# Patient Record
Sex: MALE | Race: BLACK OR AFRICAN AMERICAN | NOT HISPANIC OR LATINO | Employment: FULL TIME | RURAL
[De-identification: names, ages, dates, MRNs, and addresses within clinical notes are randomized per-mention and may not be internally consistent; named-entity substitution may affect disease eponyms.]

---

## 2016-11-07 LAB — CRC RECOMMENDATION EXT: NORMAL

## 2020-10-28 ENCOUNTER — HISTORICAL (OUTPATIENT)
Dept: ADMINISTRATIVE | Facility: HOSPITAL | Age: 55
End: 2020-10-28

## 2020-10-28 LAB
ANION GAP SERPL CALCULATED.3IONS-SCNC: 8 MMOL/L (ref 7–16)
BUN SERPL-MCNC: 12 MG/DL (ref 7–18)
CALCIUM SERPL-MCNC: 9.3 MG/DL (ref 8.5–10.1)
CHLORIDE SERPL-SCNC: 109 MMOL/L (ref 98–107)
CO2 SERPL-SCNC: 28 MMOL/L (ref 21–32)
CREAT SERPL-MCNC: 1.26 MG/DL (ref 0.7–1.3)
GLUCOSE SERPL-MCNC: 97 MG/DL (ref 74–106)
POTASSIUM SERPL-SCNC: 3.6 MMOL/L (ref 3.5–5.1)
PSA SERPL-MCNC: 0.79 NG/ML (ref 0–3.1)
SODIUM SERPL-SCNC: 141 MMOL/L (ref 136–145)

## 2021-08-10 ENCOUNTER — OFFICE VISIT (OUTPATIENT)
Dept: FAMILY MEDICINE | Facility: CLINIC | Age: 56
End: 2021-08-10
Payer: COMMERCIAL

## 2021-08-10 VITALS
OXYGEN SATURATION: 100 % | RESPIRATION RATE: 18 BRPM | TEMPERATURE: 98 F | DIASTOLIC BLOOD PRESSURE: 94 MMHG | HEART RATE: 68 BPM | SYSTOLIC BLOOD PRESSURE: 154 MMHG

## 2021-08-10 DIAGNOSIS — Z20.822 EXPOSURE TO COVID-19 VIRUS: ICD-10-CM

## 2021-08-10 DIAGNOSIS — U07.1 COVID-19: Primary | ICD-10-CM

## 2021-08-10 LAB
CTP QC/QA: YES
SARS-COV-2 AG RESP QL IA.RAPID: POSITIVE

## 2021-08-10 PROCEDURE — 99202 PR OFFICE/OUTPT VISIT, NEW, LEVL II, 15-29 MIN: ICD-10-PCS | Mod: ,,, | Performed by: FAMILY MEDICINE

## 2021-08-10 PROCEDURE — 3077F PR MOST RECENT SYSTOLIC BLOOD PRESSURE >= 140 MM HG: ICD-10-PCS | Mod: ,,, | Performed by: FAMILY MEDICINE

## 2021-08-10 PROCEDURE — 1159F MED LIST DOCD IN RCRD: CPT | Mod: ,,, | Performed by: FAMILY MEDICINE

## 2021-08-10 PROCEDURE — 87426 SARS CORONAVIRUS 2 ANTIGEN POCT: ICD-10-PCS | Mod: QW,,, | Performed by: FAMILY MEDICINE

## 2021-08-10 PROCEDURE — 87426 SARSCOV CORONAVIRUS AG IA: CPT | Mod: QW,,, | Performed by: FAMILY MEDICINE

## 2021-08-10 PROCEDURE — 1160F PR REVIEW ALL MEDS BY PRESCRIBER/CLIN PHARMACIST DOCUMENTED: ICD-10-PCS | Mod: ,,, | Performed by: FAMILY MEDICINE

## 2021-08-10 PROCEDURE — 3077F SYST BP >= 140 MM HG: CPT | Mod: ,,, | Performed by: FAMILY MEDICINE

## 2021-08-10 PROCEDURE — 3080F DIAST BP >= 90 MM HG: CPT | Mod: ,,, | Performed by: FAMILY MEDICINE

## 2021-08-10 PROCEDURE — 1159F PR MEDICATION LIST DOCUMENTED IN MEDICAL RECORD: ICD-10-PCS | Mod: ,,, | Performed by: FAMILY MEDICINE

## 2021-08-10 PROCEDURE — 3080F PR MOST RECENT DIASTOLIC BLOOD PRESSURE >= 90 MM HG: ICD-10-PCS | Mod: ,,, | Performed by: FAMILY MEDICINE

## 2021-08-10 PROCEDURE — 1160F RVW MEDS BY RX/DR IN RCRD: CPT | Mod: ,,, | Performed by: FAMILY MEDICINE

## 2021-08-10 PROCEDURE — 99202 OFFICE O/P NEW SF 15 MIN: CPT | Mod: ,,, | Performed by: FAMILY MEDICINE

## 2021-08-10 RX ORDER — AMLODIPINE BESYLATE 10 MG/1
10 TABLET ORAL NIGHTLY
COMMUNITY
Start: 2021-07-31 | End: 2021-11-16 | Stop reason: SDUPTHER

## 2021-08-10 RX ORDER — METOPROLOL SUCCINATE 200 MG/1
400 TABLET, EXTENDED RELEASE ORAL DAILY
COMMUNITY
Start: 2021-08-04 | End: 2021-11-16 | Stop reason: SDUPTHER

## 2021-08-11 ENCOUNTER — TELEPHONE (OUTPATIENT)
Dept: INFECTIOUS DISEASES | Facility: HOSPITAL | Age: 56
End: 2021-08-11

## 2021-11-16 ENCOUNTER — OFFICE VISIT (OUTPATIENT)
Dept: FAMILY MEDICINE | Facility: CLINIC | Age: 56
End: 2021-11-16
Payer: COMMERCIAL

## 2021-11-16 VITALS
RESPIRATION RATE: 16 BRPM | SYSTOLIC BLOOD PRESSURE: 160 MMHG | HEIGHT: 73 IN | TEMPERATURE: 98 F | BODY MASS INDEX: 31.3 KG/M2 | WEIGHT: 236.19 LBS | HEART RATE: 79 BPM | OXYGEN SATURATION: 99 % | DIASTOLIC BLOOD PRESSURE: 110 MMHG

## 2021-11-16 DIAGNOSIS — Z11.4 SCREENING FOR HIV (HUMAN IMMUNODEFICIENCY VIRUS): ICD-10-CM

## 2021-11-16 DIAGNOSIS — R11.0 NAUSEA: ICD-10-CM

## 2021-11-16 DIAGNOSIS — I10 PRIMARY HYPERTENSION: Primary | ICD-10-CM

## 2021-11-16 DIAGNOSIS — Z11.59 ENCOUNTER FOR HEPATITIS C SCREENING TEST FOR LOW RISK PATIENT: ICD-10-CM

## 2021-11-16 PROCEDURE — 3077F PR MOST RECENT SYSTOLIC BLOOD PRESSURE >= 140 MM HG: ICD-10-PCS | Mod: ,,, | Performed by: FAMILY MEDICINE

## 2021-11-16 PROCEDURE — 96372 PR INJECTION,THERAP/PROPH/DIAG2ST, IM OR SUBCUT: ICD-10-PCS | Mod: ,,, | Performed by: FAMILY MEDICINE

## 2021-11-16 PROCEDURE — 3008F BODY MASS INDEX DOCD: CPT | Mod: ,,, | Performed by: FAMILY MEDICINE

## 2021-11-16 PROCEDURE — 3080F DIAST BP >= 90 MM HG: CPT | Mod: ,,, | Performed by: FAMILY MEDICINE

## 2021-11-16 PROCEDURE — 3080F PR MOST RECENT DIASTOLIC BLOOD PRESSURE >= 90 MM HG: ICD-10-PCS | Mod: ,,, | Performed by: FAMILY MEDICINE

## 2021-11-16 PROCEDURE — 3077F SYST BP >= 140 MM HG: CPT | Mod: ,,, | Performed by: FAMILY MEDICINE

## 2021-11-16 PROCEDURE — 1159F MED LIST DOCD IN RCRD: CPT | Mod: ,,, | Performed by: FAMILY MEDICINE

## 2021-11-16 PROCEDURE — 99214 OFFICE O/P EST MOD 30 MIN: CPT | Mod: 25,,, | Performed by: FAMILY MEDICINE

## 2021-11-16 PROCEDURE — 3008F PR BODY MASS INDEX (BMI) DOCUMENTED: ICD-10-PCS | Mod: ,,, | Performed by: FAMILY MEDICINE

## 2021-11-16 PROCEDURE — 1159F PR MEDICATION LIST DOCUMENTED IN MEDICAL RECORD: ICD-10-PCS | Mod: ,,, | Performed by: FAMILY MEDICINE

## 2021-11-16 PROCEDURE — 1160F RVW MEDS BY RX/DR IN RCRD: CPT | Mod: ,,, | Performed by: FAMILY MEDICINE

## 2021-11-16 PROCEDURE — 96372 THER/PROPH/DIAG INJ SC/IM: CPT | Mod: ,,, | Performed by: FAMILY MEDICINE

## 2021-11-16 PROCEDURE — 1160F PR REVIEW ALL MEDS BY PRESCRIBER/CLIN PHARMACIST DOCUMENTED: ICD-10-PCS | Mod: ,,, | Performed by: FAMILY MEDICINE

## 2021-11-16 PROCEDURE — 99214 PR OFFICE/OUTPT VISIT, EST, LEVL IV, 30-39 MIN: ICD-10-PCS | Mod: 25,,, | Performed by: FAMILY MEDICINE

## 2021-11-16 RX ORDER — ONDANSETRON 2 MG/ML
4 INJECTION INTRAMUSCULAR; INTRAVENOUS ONCE
Status: COMPLETED | OUTPATIENT
Start: 2021-11-16 | End: 2021-11-16

## 2021-11-16 RX ORDER — METOPROLOL SUCCINATE 200 MG/1
400 TABLET, EXTENDED RELEASE ORAL DAILY
Qty: 180 TABLET | Refills: 1 | Status: SHIPPED | OUTPATIENT
Start: 2021-11-16 | End: 2022-01-27 | Stop reason: SDUPTHER

## 2021-11-16 RX ORDER — CLONIDINE HYDROCHLORIDE 0.1 MG/1
0.1 TABLET ORAL
Status: COMPLETED | OUTPATIENT
Start: 2021-11-16 | End: 2021-11-16

## 2021-11-16 RX ORDER — AMLODIPINE BESYLATE 10 MG/1
10 TABLET ORAL NIGHTLY
Qty: 90 TABLET | Refills: 1 | Status: SHIPPED | OUTPATIENT
Start: 2021-11-16 | End: 2022-01-27 | Stop reason: SDUPTHER

## 2021-11-16 RX ADMIN — ONDANSETRON 4 MG: 2 INJECTION INTRAMUSCULAR; INTRAVENOUS at 03:11

## 2021-11-16 RX ADMIN — CLONIDINE HYDROCHLORIDE 0.1 MG: 0.1 TABLET ORAL at 03:11

## 2022-01-27 ENCOUNTER — OFFICE VISIT (OUTPATIENT)
Dept: FAMILY MEDICINE | Facility: CLINIC | Age: 57
End: 2022-01-27
Payer: COMMERCIAL

## 2022-01-27 VITALS
SYSTOLIC BLOOD PRESSURE: 151 MMHG | DIASTOLIC BLOOD PRESSURE: 94 MMHG | WEIGHT: 236 LBS | HEIGHT: 72 IN | BODY MASS INDEX: 31.97 KG/M2 | TEMPERATURE: 98 F | OXYGEN SATURATION: 99 % | HEART RATE: 64 BPM | RESPIRATION RATE: 20 BRPM

## 2022-01-27 DIAGNOSIS — I10 PRIMARY HYPERTENSION: ICD-10-CM

## 2022-01-27 DIAGNOSIS — Z23 NEED FOR VACCINATION: ICD-10-CM

## 2022-01-27 DIAGNOSIS — I10 WHITE COAT SYNDROME WITH DIAGNOSIS OF HYPERTENSION: ICD-10-CM

## 2022-01-27 DIAGNOSIS — Z12.11 ENCOUNTER FOR SCREENING COLONOSCOPY: ICD-10-CM

## 2022-01-27 DIAGNOSIS — Z00.00 ROUTINE GENERAL MEDICAL EXAMINATION AT A HEALTH CARE FACILITY: Primary | ICD-10-CM

## 2022-01-27 DIAGNOSIS — Z11.4 SCREENING FOR HIV (HUMAN IMMUNODEFICIENCY VIRUS): ICD-10-CM

## 2022-01-27 DIAGNOSIS — Z11.59 ENCOUNTER FOR HEPATITIS C SCREENING TEST FOR LOW RISK PATIENT: ICD-10-CM

## 2022-01-27 DIAGNOSIS — Z13.1 DIABETES MELLITUS SCREENING: ICD-10-CM

## 2022-01-27 DIAGNOSIS — Z13.220 ENCOUNTER FOR SCREENING FOR LIPID DISORDER: ICD-10-CM

## 2022-01-27 LAB
CHOLEST SERPL-MCNC: 162 MG/DL (ref 0–200)
CHOLEST/HDLC SERPL: 3.1 {RATIO}
GLUCOSE SERPL-MCNC: 115 MG/DL (ref 74–106)
HCV AB SER QL: NORMAL
HDLC SERPL-MCNC: 52 MG/DL (ref 40–60)
HIV 1+O+2 AB SERPL QL: NORMAL
LDLC SERPL CALC-MCNC: 101 MG/DL
LDLC/HDLC SERPL: 1.9 {RATIO}
NONHDLC SERPL-MCNC: 110 MG/DL
TRIGL SERPL-MCNC: 47 MG/DL (ref 35–150)
VLDLC SERPL-MCNC: 9 MG/DL

## 2022-01-27 PROCEDURE — 90715 TDAP VACCINE 7 YRS/> IM: CPT | Mod: ,,, | Performed by: FAMILY MEDICINE

## 2022-01-27 PROCEDURE — 1159F MED LIST DOCD IN RCRD: CPT | Mod: ,,, | Performed by: FAMILY MEDICINE

## 2022-01-27 PROCEDURE — 3077F SYST BP >= 140 MM HG: CPT | Mod: ,,, | Performed by: FAMILY MEDICINE

## 2022-01-27 PROCEDURE — 99396 PREV VISIT EST AGE 40-64: CPT | Mod: 25,,, | Performed by: FAMILY MEDICINE

## 2022-01-27 PROCEDURE — 82947 ASSAY GLUCOSE BLOOD QUANT: CPT | Mod: ,,, | Performed by: CLINICAL MEDICAL LABORATORY

## 2022-01-27 PROCEDURE — 90471 TDAP VACCINE GREATER THAN OR EQUAL TO 7YO IM: ICD-10-PCS | Mod: ,,, | Performed by: FAMILY MEDICINE

## 2022-01-27 PROCEDURE — 80061 LIPID PANEL: ICD-10-PCS | Mod: ,,, | Performed by: CLINICAL MEDICAL LABORATORY

## 2022-01-27 PROCEDURE — 3077F PR MOST RECENT SYSTOLIC BLOOD PRESSURE >= 140 MM HG: ICD-10-PCS | Mod: ,,, | Performed by: FAMILY MEDICINE

## 2022-01-27 PROCEDURE — 87389 HIV 1 / 2 ANTIBODY: ICD-10-PCS | Mod: ,,, | Performed by: CLINICAL MEDICAL LABORATORY

## 2022-01-27 PROCEDURE — 90471 IMMUNIZATION ADMIN: CPT | Mod: ,,, | Performed by: FAMILY MEDICINE

## 2022-01-27 PROCEDURE — 3008F BODY MASS INDEX DOCD: CPT | Mod: ,,, | Performed by: FAMILY MEDICINE

## 2022-01-27 PROCEDURE — 3080F DIAST BP >= 90 MM HG: CPT | Mod: ,,, | Performed by: FAMILY MEDICINE

## 2022-01-27 PROCEDURE — 90715 TDAP VACCINE GREATER THAN OR EQUAL TO 7YO IM: ICD-10-PCS | Mod: ,,, | Performed by: FAMILY MEDICINE

## 2022-01-27 PROCEDURE — 3080F PR MOST RECENT DIASTOLIC BLOOD PRESSURE >= 90 MM HG: ICD-10-PCS | Mod: ,,, | Performed by: FAMILY MEDICINE

## 2022-01-27 PROCEDURE — 1160F PR REVIEW ALL MEDS BY PRESCRIBER/CLIN PHARMACIST DOCUMENTED: ICD-10-PCS | Mod: ,,, | Performed by: FAMILY MEDICINE

## 2022-01-27 PROCEDURE — 80061 LIPID PANEL: CPT | Mod: ,,, | Performed by: CLINICAL MEDICAL LABORATORY

## 2022-01-27 PROCEDURE — 82947 GLUCOSE, FASTING: ICD-10-PCS | Mod: ,,, | Performed by: CLINICAL MEDICAL LABORATORY

## 2022-01-27 PROCEDURE — 99396 PR PREVENTIVE VISIT,EST,40-64: ICD-10-PCS | Mod: 25,,, | Performed by: FAMILY MEDICINE

## 2022-01-27 PROCEDURE — 87389 HIV-1 AG W/HIV-1&-2 AB AG IA: CPT | Mod: ,,, | Performed by: CLINICAL MEDICAL LABORATORY

## 2022-01-27 PROCEDURE — 86803 HEPATITIS C ANTIBODY: ICD-10-PCS | Mod: ,,, | Performed by: CLINICAL MEDICAL LABORATORY

## 2022-01-27 PROCEDURE — 1160F RVW MEDS BY RX/DR IN RCRD: CPT | Mod: ,,, | Performed by: FAMILY MEDICINE

## 2022-01-27 PROCEDURE — 3008F PR BODY MASS INDEX (BMI) DOCUMENTED: ICD-10-PCS | Mod: ,,, | Performed by: FAMILY MEDICINE

## 2022-01-27 PROCEDURE — 86803 HEPATITIS C AB TEST: CPT | Mod: ,,, | Performed by: CLINICAL MEDICAL LABORATORY

## 2022-01-27 PROCEDURE — 1159F PR MEDICATION LIST DOCUMENTED IN MEDICAL RECORD: ICD-10-PCS | Mod: ,,, | Performed by: FAMILY MEDICINE

## 2022-01-27 RX ORDER — AMLODIPINE BESYLATE 10 MG/1
10 TABLET ORAL NIGHTLY
Qty: 90 TABLET | Refills: 1 | Status: SHIPPED | OUTPATIENT
Start: 2022-01-27 | End: 2022-06-27 | Stop reason: SDUPTHER

## 2022-01-27 RX ORDER — METOPROLOL SUCCINATE 200 MG/1
400 TABLET, EXTENDED RELEASE ORAL DAILY
Qty: 180 TABLET | Refills: 1 | Status: SHIPPED | OUTPATIENT
Start: 2022-01-27 | End: 2022-05-11

## 2022-01-27 NOTE — PROGRESS NOTES
Ashley Rodriguez MD        PATIENT NAME: Maranda Clark  : 1965  DATE: 22  MRN: [id number]      Billing Provider: Ashley Rodriguez MD  Level of Service: AL PREVENTIVE VISIT,EST,40-64  Patient PCP Information     Provider PCP Type    Ashley Rodriguez MD General          Reason for Visit / Chief Complaint: Annual Exam       History of Present Illness      Maranda Clark presents to the clinic with Annual Exam       dental visits discussed    sun screen use discussed, use of helmets and seat belts discussed  Gun safety discussed  Sleep discussed  Diet and exercise discussed    At home his BP is much better in the 130s/80s      Review of Systems     Review of Systems   Constitutional: Negative for activity change, appetite change, fatigue and fever.   Respiratory: Negative for shortness of breath.    Allergic/Immunologic: Positive for environmental allergies.   Psychiatric/Behavioral: Negative for agitation, behavioral problems and suicidal ideas.       Medical / Social / Family History   History reviewed. No pertinent past medical history.    History reviewed. No pertinent surgical history.    Social History    reports that he has never smoked. He has never used smokeless tobacco. He reports current alcohol use. He reports that he does not use drugs.    Family History  's family history is not on file.    Medications and Allergies     Medications  Outpatient Medications Marked as Taking for the 22 encounter (Office Visit) with Ashley Rodriguez MD   Medication Sig Dispense Refill    [DISCONTINUED] amLODIPine (NORVASC) 10 MG tablet Take 1 tablet (10 mg total) by mouth nightly. 90 tablet 1    [DISCONTINUED] metoprolol succinate (TOPROL-XL) 200 MG 24 hr tablet Take 2 tablets (400 mg total) by mouth once daily. 180 tablet 1       Allergies  Review of patient's allergies indicates:  No Known Allergies    Physical Examination   BP (!) 151/94 (BP Location: Right arm, Patient Position: Sitting, BP Method:  Medium (Automatic))   Pulse 64   Temp 98.2 °F (36.8 °C) (Temporal)   Resp 20   Ht 6' (1.829 m)   Wt 107 kg (236 lb)   SpO2 99%   BMI 32.01 kg/m²     Physical Exam  Constitutional:       Appearance: Normal appearance. He is normal weight.   Cardiovascular:      Rate and Rhythm: Normal rate and regular rhythm.   Pulmonary:      Effort: Pulmonary effort is normal.      Breath sounds: Normal breath sounds.   Neurological:      Mental Status: He is alert.   Psychiatric:         Mood and Affect: Mood normal.         Behavior: Behavior normal.         Assessment and Plan (including Health Maintenance)     Plan:         Problem List Items Addressed This Visit    None     Visit Diagnoses     Routine general medical examination at a health care facility    -  Primary    Encounter for screening for lipid disorder        Relevant Orders    Lipid Panel    Diabetes mellitus screening        Relevant Orders    Glucose, Fasting    Encounter for hepatitis C screening test for low risk patient        Relevant Orders    Hepatitis C Antibody    Screening for HIV (human immunodeficiency virus)        Relevant Orders    HIV 1/2 Ag/Ab (4th Gen)    Primary hypertension        Relevant Medications    amLODIPine (NORVASC) 10 MG tablet    metoprolol succinate (TOPROL-XL) 200 MG 24 hr tablet    White coat syndrome with diagnosis of hypertension        Need for vaccination        Relevant Orders    (In Office Administered) Tdap Vaccine    Encounter for screening colonoscopy        Relevant Orders    Ambulatory referral/consult to Gastroenterology          He is going to try to improve healthy choices, reduce calorie intake, try to do more physical activity to lose weight. Recommended at least 150 minutes a week with resistance training as tolerated  Monitor weight  attend regularly scheduled apts   Schedule yearly eye exam  Take medications as prescirved  Improve nutrition, decrease car intake, decrease fast food  Stay away from tobacco  products  Sun screen recommended and discuseed        Follow up in about 1 year (around 1/27/2023).        Signature:  Ashley Rodriguez MD      Date of encounter: 1/27/22

## 2022-02-15 DIAGNOSIS — Z86.010 HISTORY OF COLON POLYPS: Primary | ICD-10-CM

## 2022-06-27 ENCOUNTER — OFFICE VISIT (OUTPATIENT)
Dept: FAMILY MEDICINE | Facility: CLINIC | Age: 57
End: 2022-06-27
Payer: COMMERCIAL

## 2022-06-27 VITALS
WEIGHT: 225 LBS | DIASTOLIC BLOOD PRESSURE: 92 MMHG | TEMPERATURE: 99 F | RESPIRATION RATE: 20 BRPM | BODY MASS INDEX: 30.48 KG/M2 | HEIGHT: 72 IN | OXYGEN SATURATION: 95 % | SYSTOLIC BLOOD PRESSURE: 136 MMHG | HEART RATE: 52 BPM

## 2022-06-27 DIAGNOSIS — L30.9 DERMATITIS: ICD-10-CM

## 2022-06-27 DIAGNOSIS — F10.10 ALCOHOL ABUSE: ICD-10-CM

## 2022-06-27 DIAGNOSIS — Z12.5 SCREENING PSA (PROSTATE SPECIFIC ANTIGEN): ICD-10-CM

## 2022-06-27 DIAGNOSIS — I10 PRIMARY HYPERTENSION: Primary | ICD-10-CM

## 2022-06-27 LAB
ALBUMIN SERPL BCP-MCNC: 3.6 G/DL (ref 3.5–5)
ALBUMIN/GLOB SERPL: 0.9 {RATIO}
ALP SERPL-CCNC: 70 U/L (ref 45–115)
ALT SERPL W P-5'-P-CCNC: 34 U/L (ref 16–61)
ANION GAP SERPL CALCULATED.3IONS-SCNC: 7 MMOL/L (ref 7–16)
AST SERPL W P-5'-P-CCNC: 25 U/L (ref 15–37)
BILIRUB SERPL-MCNC: 0.4 MG/DL (ref 0–1.2)
BUN SERPL-MCNC: 11 MG/DL (ref 7–18)
BUN/CREAT SERPL: 10 (ref 6–20)
CALCIUM SERPL-MCNC: 9.6 MG/DL (ref 8.5–10.1)
CHLORIDE SERPL-SCNC: 107 MMOL/L (ref 98–107)
CHOLEST SERPL-MCNC: 208 MG/DL (ref 0–200)
CHOLEST/HDLC SERPL: 4.1 {RATIO}
CO2 SERPL-SCNC: 29 MMOL/L (ref 21–32)
CREAT SERPL-MCNC: 1.06 MG/DL (ref 0.7–1.3)
GLOBULIN SER-MCNC: 3.9 G/DL (ref 2–4)
GLUCOSE SERPL-MCNC: 99 MG/DL (ref 74–106)
HDLC SERPL-MCNC: 51 MG/DL (ref 40–60)
LDLC SERPL CALC-MCNC: 145 MG/DL
LDLC/HDLC SERPL: 2.8 {RATIO}
NONHDLC SERPL-MCNC: 157 MG/DL
POTASSIUM SERPL-SCNC: 3.9 MMOL/L (ref 3.5–5.1)
PROT SERPL-MCNC: 7.5 G/DL (ref 6.4–8.2)
SODIUM SERPL-SCNC: 139 MMOL/L (ref 136–145)
TRIGL SERPL-MCNC: 61 MG/DL (ref 35–150)
VLDLC SERPL-MCNC: 12 MG/DL

## 2022-06-27 PROCEDURE — 84154 ASSAY OF PSA FREE: CPT | Mod: 90,,, | Performed by: CLINICAL MEDICAL LABORATORY

## 2022-06-27 PROCEDURE — 99213 PR OFFICE/OUTPT VISIT, EST, LEVL III, 20-29 MIN: ICD-10-PCS | Mod: ,,, | Performed by: FAMILY MEDICINE

## 2022-06-27 PROCEDURE — 80061 LIPID PANEL: CPT | Mod: ,,, | Performed by: CLINICAL MEDICAL LABORATORY

## 2022-06-27 PROCEDURE — 3008F PR BODY MASS INDEX (BMI) DOCUMENTED: ICD-10-PCS | Mod: ,,, | Performed by: FAMILY MEDICINE

## 2022-06-27 PROCEDURE — 3075F SYST BP GE 130 - 139MM HG: CPT | Mod: ,,, | Performed by: FAMILY MEDICINE

## 2022-06-27 PROCEDURE — 3075F PR MOST RECENT SYSTOLIC BLOOD PRESS GE 130-139MM HG: ICD-10-PCS | Mod: ,,, | Performed by: FAMILY MEDICINE

## 2022-06-27 PROCEDURE — 80053 COMPREHEN METABOLIC PANEL: CPT | Mod: ,,, | Performed by: CLINICAL MEDICAL LABORATORY

## 2022-06-27 PROCEDURE — 80053 COMPREHENSIVE METABOLIC PANEL: ICD-10-PCS | Mod: ,,, | Performed by: CLINICAL MEDICAL LABORATORY

## 2022-06-27 PROCEDURE — 84153 PSA, TOTAL AND FREE: ICD-10-PCS | Mod: 90,,, | Performed by: CLINICAL MEDICAL LABORATORY

## 2022-06-27 PROCEDURE — 1160F RVW MEDS BY RX/DR IN RCRD: CPT | Mod: ,,, | Performed by: FAMILY MEDICINE

## 2022-06-27 PROCEDURE — 3080F PR MOST RECENT DIASTOLIC BLOOD PRESSURE >= 90 MM HG: ICD-10-PCS | Mod: ,,, | Performed by: FAMILY MEDICINE

## 2022-06-27 PROCEDURE — 80061 LIPID PANEL: ICD-10-PCS | Mod: ,,, | Performed by: CLINICAL MEDICAL LABORATORY

## 2022-06-27 PROCEDURE — 1160F PR REVIEW ALL MEDS BY PRESCRIBER/CLIN PHARMACIST DOCUMENTED: ICD-10-PCS | Mod: ,,, | Performed by: FAMILY MEDICINE

## 2022-06-27 PROCEDURE — 84154 PSA, TOTAL AND FREE: ICD-10-PCS | Mod: 90,,, | Performed by: CLINICAL MEDICAL LABORATORY

## 2022-06-27 PROCEDURE — 1159F PR MEDICATION LIST DOCUMENTED IN MEDICAL RECORD: ICD-10-PCS | Mod: ,,, | Performed by: FAMILY MEDICINE

## 2022-06-27 PROCEDURE — 3080F DIAST BP >= 90 MM HG: CPT | Mod: ,,, | Performed by: FAMILY MEDICINE

## 2022-06-27 PROCEDURE — 3008F BODY MASS INDEX DOCD: CPT | Mod: ,,, | Performed by: FAMILY MEDICINE

## 2022-06-27 PROCEDURE — 84153 ASSAY OF PSA TOTAL: CPT | Mod: 90,,, | Performed by: CLINICAL MEDICAL LABORATORY

## 2022-06-27 PROCEDURE — 99213 OFFICE O/P EST LOW 20 MIN: CPT | Mod: ,,, | Performed by: FAMILY MEDICINE

## 2022-06-27 PROCEDURE — 1159F MED LIST DOCD IN RCRD: CPT | Mod: ,,, | Performed by: FAMILY MEDICINE

## 2022-06-27 RX ORDER — METOPROLOL SUCCINATE 200 MG/1
400 TABLET, EXTENDED RELEASE ORAL DAILY
Qty: 180 TABLET | Refills: 1 | Status: SHIPPED | OUTPATIENT
Start: 2022-06-27 | End: 2022-12-07

## 2022-06-27 RX ORDER — DESONIDE 0.5 MG/G
CREAM TOPICAL 2 TIMES DAILY
Qty: 60 G | Refills: 3 | Status: SHIPPED | OUTPATIENT
Start: 2022-06-27 | End: 2023-01-09

## 2022-06-27 RX ORDER — AMLODIPINE BESYLATE 10 MG/1
10 TABLET ORAL NIGHTLY
Qty: 90 TABLET | Refills: 1 | Status: SHIPPED | OUTPATIENT
Start: 2022-06-27 | End: 2022-12-07

## 2022-06-27 NOTE — PROGRESS NOTES
Ashley Rodriguez MD        PATIENT NAME: Maranda Clark  : 1965  DATE: 22  MRN: [id number]      Billing Provider: Ashley Rodriguez MD  Level of Service:   Patient PCP Information     Provider PCP Type    Ashley Rodriguez MD General          Reason for Visit / Chief Complaint: Medication Refill       History of Present Illness      Maranda Clark presents to the clinic with Medication Refill     He is here for medication refill, he did not bring his medications, he does not know the name of his medication, but he does need refills on levothyroxine, he has acquire hypothyroidism, has not had a recent TSH, BP noted    Drinks about 24 beers a week           Review of Systems     Review of Systems   Constitutional: Negative for activity change and unexpected weight change.   HENT: Negative for hearing loss, rhinorrhea and trouble swallowing.    Eyes: Negative for discharge and visual disturbance.   Respiratory: Negative for chest tightness and wheezing.    Cardiovascular: Negative for chest pain and palpitations.   Gastrointestinal: Negative for blood in stool, constipation, diarrhea and vomiting.   Endocrine: Negative for polydipsia and polyuria.   Genitourinary: Negative for difficulty urinating, hematuria and urgency.   Musculoskeletal: Negative for arthralgias, joint swelling and neck pain.   Neurological: Negative for weakness and headaches.   Psychiatric/Behavioral: Negative for confusion and dysphoric mood.       Medical / Social / Family History   History reviewed. No pertinent past medical history.    History reviewed. No pertinent surgical history.    Social History    reports that he has never smoked. He has never used smokeless tobacco. He reports current alcohol use. He reports that he does not use drugs.    Family History  's family history is not on file.    Medications and Allergies     Medications  Outpatient Medications Marked as Taking for the 22 encounter (Office Visit) with Ashley  MD Michael   Medication Sig Dispense Refill    [DISCONTINUED] amLODIPine (NORVASC) 10 MG tablet Take 1 tablet (10 mg total) by mouth nightly. 90 tablet 1    [DISCONTINUED] metoprolol succinate (TOPROL-XL) 200 MG 24 hr tablet Take 2 tablets by mouth once daily 60 tablet 0       Allergies  Review of patient's allergies indicates:  No Known Allergies    Physical Examination   BP (!) 136/92   Pulse (!) 52   Temp 98.7 °F (37.1 °C) (Oral)   Resp 20   Ht 6' (1.829 m)   Wt 102.1 kg (225 lb)   SpO2 95%   BMI 30.52 kg/m²     Physical Exam  Constitutional:       Appearance: Normal appearance. He is obese.   Cardiovascular:      Rate and Rhythm: Normal rate and regular rhythm.   Pulmonary:      Effort: Pulmonary effort is normal.      Breath sounds: Normal breath sounds.   Neurological:      Mental Status: He is alert.   Psychiatric:         Mood and Affect: Mood normal.         Behavior: Behavior normal.         Assessment and Plan (including Health Maintenance)     Plan:         Problem List Items Addressed This Visit    None     Visit Diagnoses     Primary hypertension    -  Primary    Relevant Medications    metoprolol succinate (TOPROL-XL) 200 MG 24 hr tablet    amLODIPine (NORVASC) 10 MG tablet    Other Relevant Orders    Comprehensive Metabolic Panel    Lipid Panel    Screening PSA (prostate specific antigen)        Relevant Orders    PSA, Total and Free    Dermatitis        Relevant Medications    desonide (DESOWEN) 0.05 % cream    Alcohol abuse            - work on diet, specifically cutting back bread, breaded things, cereal, pasta, potatoes, rice  - try to exercise at least 150min a week divided however you want  - if you can do weight, even very light weight do them  - try not to use to much salt, if any, remember that things that are preserved or frozen often contain large amounts of salt as a perseve      Follow up in about 3 months (around 9/27/2022).        Signature:  Ashley Rodriguez MD      Date of  encounter: 6/27/22

## 2022-06-29 ENCOUNTER — TELEPHONE (OUTPATIENT)
Dept: FAMILY MEDICINE | Facility: CLINIC | Age: 57
End: 2022-06-29
Payer: COMMERCIAL

## 2022-06-29 LAB
PSA FREE MFR SERPL: NORMAL RATIO
PSA FREE SERPL IA-MCNC: 0.2 NG/ML
PSA SERPL IA-MCNC: 0.86 NG/ML

## 2022-06-29 NOTE — TELEPHONE ENCOUNTER
----- Message from Ashley Rodriguez MD sent at 6/28/2022  7:13 AM CDT -----  \[facility] [place] [facility]\"" labs

## 2022-07-06 ENCOUNTER — TELEPHONE (OUTPATIENT)
Dept: FAMILY MEDICINE | Facility: CLINIC | Age: 57
End: 2022-07-06
Payer: COMMERCIAL

## 2022-07-06 RX ORDER — TRIAMCINOLONE ACETONIDE 0.25 MG/G
CREAM TOPICAL 2 TIMES DAILY
Qty: 80 G | Refills: 0 | Status: SHIPPED | OUTPATIENT
Start: 2022-07-06 | End: 2023-01-09 | Stop reason: SDUPTHER

## 2022-07-06 NOTE — TELEPHONE ENCOUNTER
----- Message from Ramya Torres sent at 7/6/2022 11:00 AM CDT -----  Pt called wanting to get another cream sent in because his insurance won't pay for this one, pharmacy [facility].       Seen on 06/27/22

## 2023-01-09 ENCOUNTER — OFFICE VISIT (OUTPATIENT)
Dept: FAMILY MEDICINE | Facility: CLINIC | Age: 58
End: 2023-01-09
Payer: COMMERCIAL

## 2023-01-09 VITALS
HEIGHT: 72 IN | WEIGHT: 224 LBS | DIASTOLIC BLOOD PRESSURE: 96 MMHG | TEMPERATURE: 99 F | SYSTOLIC BLOOD PRESSURE: 154 MMHG | OXYGEN SATURATION: 99 % | HEART RATE: 57 BPM | RESPIRATION RATE: 18 BRPM | BODY MASS INDEX: 30.34 KG/M2

## 2023-01-09 DIAGNOSIS — I10 PRIMARY HYPERTENSION: Primary | ICD-10-CM

## 2023-01-09 DIAGNOSIS — Z12.11 SCREENING FOR COLON CANCER: ICD-10-CM

## 2023-01-09 DIAGNOSIS — M25.559 PAIN IN UNSPECIFIED HIP: ICD-10-CM

## 2023-01-09 DIAGNOSIS — R73.03 PRE-DIABETES: ICD-10-CM

## 2023-01-09 DIAGNOSIS — M25.552 LEFT HIP PAIN: ICD-10-CM

## 2023-01-09 DIAGNOSIS — Z12.5 SCREENING PSA (PROSTATE SPECIFIC ANTIGEN): ICD-10-CM

## 2023-01-09 LAB
ALBUMIN SERPL BCP-MCNC: 3.9 G/DL (ref 3.5–5)
ALBUMIN/GLOB SERPL: 0.9 {RATIO}
ALP SERPL-CCNC: 73 U/L (ref 45–115)
ALT SERPL W P-5'-P-CCNC: 26 U/L (ref 16–61)
ANION GAP SERPL CALCULATED.3IONS-SCNC: 7 MMOL/L (ref 7–16)
AST SERPL W P-5'-P-CCNC: 18 U/L (ref 15–37)
BILIRUB SERPL-MCNC: 0.5 MG/DL (ref ?–1.2)
BUN SERPL-MCNC: 9 MG/DL (ref 7–18)
BUN/CREAT SERPL: 8 (ref 6–20)
CALCIUM SERPL-MCNC: 10.2 MG/DL (ref 8.5–10.1)
CHLORIDE SERPL-SCNC: 107 MMOL/L (ref 98–107)
CHOLEST SERPL-MCNC: 186 MG/DL (ref 0–200)
CHOLEST/HDLC SERPL: 3.6 {RATIO}
CO2 SERPL-SCNC: 30 MMOL/L (ref 21–32)
CREAT SERPL-MCNC: 1.17 MG/DL (ref 0.7–1.3)
EGFR (NO RACE VARIABLE) (RUSH/TITUS): 73 ML/MIN/1.73M²
EST. AVERAGE GLUCOSE BLD GHB EST-MCNC: 107 MG/DL
GLOBULIN SER-MCNC: 4.2 G/DL (ref 2–4)
GLUCOSE SERPL-MCNC: 95 MG/DL (ref 74–106)
HBA1C MFR BLD HPLC: 5.8 % (ref 4.5–6.6)
HDLC SERPL-MCNC: 51 MG/DL (ref 40–60)
LDLC SERPL CALC-MCNC: 124 MG/DL
LDLC/HDLC SERPL: 2.4 {RATIO}
NONHDLC SERPL-MCNC: 135 MG/DL
POTASSIUM SERPL-SCNC: 4 MMOL/L (ref 3.5–5.1)
PROT SERPL-MCNC: 8.1 G/DL (ref 6.4–8.2)
SODIUM SERPL-SCNC: 140 MMOL/L (ref 136–145)
TRIGL SERPL-MCNC: 56 MG/DL (ref 35–150)
TSH SERPL DL<=0.005 MIU/L-ACNC: 2.47 UIU/ML (ref 0.36–3.74)
VLDLC SERPL-MCNC: 11 MG/DL

## 2023-01-09 PROCEDURE — 3077F SYST BP >= 140 MM HG: CPT | Mod: ,,, | Performed by: FAMILY MEDICINE

## 2023-01-09 PROCEDURE — 84443 ASSAY THYROID STIM HORMONE: CPT | Mod: ,,, | Performed by: CLINICAL MEDICAL LABORATORY

## 2023-01-09 PROCEDURE — 1160F PR REVIEW ALL MEDS BY PRESCRIBER/CLIN PHARMACIST DOCUMENTED: ICD-10-PCS | Mod: ,,, | Performed by: FAMILY MEDICINE

## 2023-01-09 PROCEDURE — 1160F RVW MEDS BY RX/DR IN RCRD: CPT | Mod: ,,, | Performed by: FAMILY MEDICINE

## 2023-01-09 PROCEDURE — 1159F PR MEDICATION LIST DOCUMENTED IN MEDICAL RECORD: ICD-10-PCS | Mod: ,,, | Performed by: FAMILY MEDICINE

## 2023-01-09 PROCEDURE — 80053 COMPREHEN METABOLIC PANEL: CPT | Mod: ,,, | Performed by: CLINICAL MEDICAL LABORATORY

## 2023-01-09 PROCEDURE — 80053 COMPREHENSIVE METABOLIC PANEL: ICD-10-PCS | Mod: ,,, | Performed by: CLINICAL MEDICAL LABORATORY

## 2023-01-09 PROCEDURE — 3080F DIAST BP >= 90 MM HG: CPT | Mod: ,,, | Performed by: FAMILY MEDICINE

## 2023-01-09 PROCEDURE — 1159F MED LIST DOCD IN RCRD: CPT | Mod: ,,, | Performed by: FAMILY MEDICINE

## 2023-01-09 PROCEDURE — 3008F PR BODY MASS INDEX (BMI) DOCUMENTED: ICD-10-PCS | Mod: ,,, | Performed by: FAMILY MEDICINE

## 2023-01-09 PROCEDURE — 99214 PR OFFICE/OUTPT VISIT, EST, LEVL IV, 30-39 MIN: ICD-10-PCS | Mod: ,,, | Performed by: FAMILY MEDICINE

## 2023-01-09 PROCEDURE — 80061 LIPID PANEL: CPT | Mod: ,,, | Performed by: CLINICAL MEDICAL LABORATORY

## 2023-01-09 PROCEDURE — 3077F PR MOST RECENT SYSTOLIC BLOOD PRESSURE >= 140 MM HG: ICD-10-PCS | Mod: ,,, | Performed by: FAMILY MEDICINE

## 2023-01-09 PROCEDURE — 84443 TSH: ICD-10-PCS | Mod: ,,, | Performed by: CLINICAL MEDICAL LABORATORY

## 2023-01-09 PROCEDURE — 83036 HEMOGLOBIN GLYCOSYLATED A1C: CPT | Mod: ,,, | Performed by: CLINICAL MEDICAL LABORATORY

## 2023-01-09 PROCEDURE — 80061 LIPID PANEL: ICD-10-PCS | Mod: ,,, | Performed by: CLINICAL MEDICAL LABORATORY

## 2023-01-09 PROCEDURE — 3008F BODY MASS INDEX DOCD: CPT | Mod: ,,, | Performed by: FAMILY MEDICINE

## 2023-01-09 PROCEDURE — 99214 OFFICE O/P EST MOD 30 MIN: CPT | Mod: ,,, | Performed by: FAMILY MEDICINE

## 2023-01-09 PROCEDURE — 3080F PR MOST RECENT DIASTOLIC BLOOD PRESSURE >= 90 MM HG: ICD-10-PCS | Mod: ,,, | Performed by: FAMILY MEDICINE

## 2023-01-09 PROCEDURE — 83036 HEMOGLOBIN A1C: ICD-10-PCS | Mod: ,,, | Performed by: CLINICAL MEDICAL LABORATORY

## 2023-01-09 RX ORDER — AMLODIPINE BESYLATE 10 MG/1
10 TABLET ORAL NIGHTLY
Qty: 30 TABLET | Refills: 0 | Status: SHIPPED | OUTPATIENT
Start: 2023-01-09 | End: 2023-02-01 | Stop reason: SDUPTHER

## 2023-01-09 RX ORDER — MELOXICAM 15 MG/1
15 TABLET ORAL DAILY
Qty: 90 TABLET | Refills: 1 | Status: SHIPPED | OUTPATIENT
Start: 2023-01-09 | End: 2023-02-13 | Stop reason: SDUPTHER

## 2023-01-09 RX ORDER — TRIAMCINOLONE ACETONIDE 0.25 MG/G
CREAM TOPICAL 2 TIMES DAILY
Qty: 80 G | Refills: 0 | Status: SHIPPED | OUTPATIENT
Start: 2023-01-09 | End: 2023-02-13 | Stop reason: SDUPTHER

## 2023-01-09 RX ORDER — METOPROLOL SUCCINATE 200 MG/1
400 TABLET, EXTENDED RELEASE ORAL DAILY
Qty: 60 TABLET | Refills: 0 | Status: SHIPPED | OUTPATIENT
Start: 2023-01-09 | End: 2023-02-06

## 2023-01-09 NOTE — PROGRESS NOTES
Ashley Rodriguez MD        PATIENT NAME: Maranda Clark  : 1965  DATE: 23  MRN: [id number]      Billing Provider: Ashley Rodriguez MD  Level of Service:   Patient PCP Information       Provider PCP Type    Ashley Rodriguez MD General            Reason for Visit / Chief Complaint: Follow-up (6 mo follow up) and Hip Pain (Left hip pain- Fell several years ago. Catches sometimes. No pain at the moment.)       History of Present Illness      Maranda Clark presents to the clinic with Follow-up (6 mo follow up) and Hip Pain (Left hip pain- Fell several years ago. Catches sometimes. No pain at the moment.)     Needs refills, tolerating medications well, taking medications, currently well controlled at home, BP noted today      Review of Systems     Review of Systems   Constitutional:  Negative for activity change, appetite change, fatigue and fever.   Respiratory:  Negative for shortness of breath.    Musculoskeletal:  Positive for arthralgias, gait problem and myalgias. Negative for back pain and joint swelling.   Allergic/Immunologic: Positive for environmental allergies.   Psychiatric/Behavioral:  Negative for agitation, behavioral problems and suicidal ideas.      Medical / Social / Family History   History reviewed. No pertinent past medical history.    History reviewed. No pertinent surgical history.    Social History    reports that he has never smoked. He has never used smokeless tobacco. He reports current alcohol use. He reports that he does not use drugs.    Family History  's family history is not on file.    Medications and Allergies     Medications  Outpatient Medications Marked as Taking for the 23 encounter (Office Visit) with Ashley Rodriguez MD   Medication Sig Dispense Refill    [DISCONTINUED] amLODIPine (NORVASC) 10 MG tablet Take 1 tablet by mouth nightly 30 tablet 0    [DISCONTINUED] metoprolol succinate (TOPROL-XL) 200 MG 24 hr tablet Take 2 tablets by mouth once daily 60 tablet 0     [DISCONTINUED] triamcinolone acetonide 0.025% (KENALOG) 0.025 % cream Apply topically 2 (two) times daily. 80 g 0       Allergies  Review of patient's allergies indicates:  No Known Allergies    Physical Examination   BP (!) 154/96 (BP Location: Left arm, Patient Position: Sitting)   Pulse (!) 57   Temp 98.5 °F (36.9 °C) (Oral)   Resp 18   Ht 6' (1.829 m)   Wt 101.6 kg (224 lb)   SpO2 99%   BMI 30.38 kg/m²     Physical Exam  Constitutional:       Appearance: Normal appearance. He is normal weight.   Cardiovascular:      Rate and Rhythm: Normal rate and regular rhythm.   Pulmonary:      Effort: Pulmonary effort is normal.      Breath sounds: Normal breath sounds.   Neurological:      Mental Status: He is alert.      Motor: No weakness.      Coordination: Coordination normal.      Gait: Gait abnormal.   Psychiatric:         Mood and Affect: Mood normal.         Behavior: Behavior normal.         Assessment and Plan (including Health Maintenance)     Plan:         Problem List Items Addressed This Visit    None  Visit Diagnoses       Pre-diabetes    -  Primary    Relevant Orders    Hemoglobin A1C    TSH    Primary hypertension        Relevant Medications    amLODIPine (NORVASC) 10 MG tablet    metoprolol succinate (TOPROL-XL) 200 MG 24 hr tablet    Other Relevant Orders    Comprehensive Metabolic Panel    Lipid Panel    TSH          - work on diet, specifically cutting back bread, breaded things, cereal, pasta, potatoes, rice  - try to exercise at least 150min a week divided however you want  - if you can do weight, even very light weight do them  - try not to use to much salt, if any, remember that things that are preserved or frozen often contain large amounts of salt as a perseve      Follow up in about 6 months (around 7/9/2023).        Signature:  Ashley Rodriguez MD      Date of encounter: 1/9/23

## 2023-01-10 DIAGNOSIS — Z12.11 COLON CANCER SCREENING: Primary | ICD-10-CM

## 2023-01-11 DIAGNOSIS — M25.552 LEFT HIP PAIN: Primary | ICD-10-CM

## 2023-01-20 ENCOUNTER — CLINICAL SUPPORT (OUTPATIENT)
Dept: REHABILITATION | Facility: HOSPITAL | Age: 58
End: 2023-01-20
Payer: COMMERCIAL

## 2023-01-20 DIAGNOSIS — M25.559 PAIN IN UNSPECIFIED HIP: ICD-10-CM

## 2023-01-20 DIAGNOSIS — M25.552 HIP PAIN, CHRONIC, LEFT: ICD-10-CM

## 2023-01-20 DIAGNOSIS — G89.29 HIP PAIN, CHRONIC, LEFT: ICD-10-CM

## 2023-01-20 PROCEDURE — 97110 THERAPEUTIC EXERCISES: CPT | Mod: PN

## 2023-01-20 PROCEDURE — 97161 PT EVAL LOW COMPLEX 20 MIN: CPT | Mod: PN

## 2023-01-20 NOTE — PLAN OF CARE
[facility]   Physical Therapy Initial Evaluation    Date: 1/20/2023   Name: Maranda Clark  Clinic Number: [id number]    Therapy Diagnosis:   Encounter Diagnoses   Name Primary?    Pain in unspecified hip     Hip pain, chronic, left      Physician: Ashley Rodriguez MD    Physician Orders: PT Eval and Treat    Medical Diagnosis from Referral: left hip pain   Evaluation Date: 1/20/2023  Updated Plan of Care Due : 2/19/2022  Authorization Period Expiration: [employer]   Plan of Care Expiration: end of the year   Visit # / Visits authorized: 1/ 30    Time In: 1000  Time Out: 1100  Total Appointment Time (timed & untimed codes): 55 minutes    Precautions: Standard    Subjective   Date of onset: pt states he has been having left hip pain after falling on his left hip . Pt states he has general hip pain . Pt states he has worse pain with sitting . Pt voices he has weakness and is pretty inactive.  Pt voices he has tingling down his leg on left down to his calf at times. Pt voices he awakes with pain at night and has difficulty sleeping on that side.   History of current condition - Maranda reports: hx below      Medical History:   No past medical history on file.    Surgical History:   Maranda Clark  has no past surgical history on file.    Medications:   Maranda has a current medication list which includes the following prescription(s): amlodipine, meloxicam, metoprolol succinate, and triamcinolone acetonide 0.025%.    Allergies:   Review of patient's allergies indicates:  No Known Allergies     Imaging, bone scan films: left hip osteoarthritis present .   Mri ordered not released yet     Prior Therapy: none   Social History:   lives with their family  Occupation: [employer]   Prior Level of Function: independent   Current Level of Function: pain with sitting     Pain:  Current 3/10, worst 8/10, best 3/10   Location: left hip    generalized   Description: Aching, Dull, Burning, and Electric  Aggravating Factors:  Sitting  Easing Factors:  activity seems to help it     Patients goals: be able to sit without pain radiating down hip     Objective         Observation : pt has pain with palpation of left greater trochanteric bursitis and piriformis , no si joint pain but radicular symptoms     Pronation/Supination : Right                                           Left     Incision :    n/a            Range of Motion/Strength :                  Left Extremity                                                                        Right Extremity   AROM PROM Strength  Location  AROM    PROM   Strength   115  4   Hip      Flexion 115  5                    Extension      40  4               Internal Rotation (Prone) 35  5   25  3+               External Rotation (Prone) 35  5   -30                Hamstring length  -25                           140  5   Knee    Flexion 140  5   0  5                Extension quad lag  0  5   10  5   Ankle   Dorsiflexion 10  5                     Plantar Flexion                        Inversion                        Eversion                     Functional Impairments :  decreased left hip  range of motion and strength      Limitation/Restriction for FOTO hip Survey    Therapist reviewed FOTO scores for Maranda Clark on 1/20/2023.   FOTO documents entered into [employer] - see Media section.    Limitation Score: 59%         TREATMENT         Maranda received the treatments listed below:  THERAPEUTIC EXERCISES to develop strength, endurance, ROM, flexibility, and posture for 20 minutes including home ex program         Home Exercises and Patient Education Provided    Education provided:   - Pt performed and received home ex program.     Written Home Exercises Provided: yes.  Exercises were reviewed and Maranda was able to demonstrate them prior to the end of the session.  Maranda demonstrated good  understanding of the education provided.     Pt received cont us to left piriformis x 5 min at 2.2 traore cm/2      See EMR under Patient Instructions for exercises provided 1/20/2023.    Assessment   Maranda is a 57 y.o. male referred to outpatient Physical Therapy with a medical diagnosis of left hip pain . Patient presents with piriformis pain left with decreased hip internal rotation , left greater trochanteric bursitis pain     Patient prognosis is Excellent.   Patientt will benefit from skilled outpatient Physical Therapy to address the deficits stated above and in the chart below, provide patient /family education, and to maximize patientt's level of independence.     Plan of care discussed with patient: Yes  Patient's spiritual, cultural and educational needs considered and patient is agreeable to the plan of care and goals as stated below:     Anticipated Barriers for therapy: decreased hip internal rotation and pain with palpation of left piriformis, greater trochanteric bursitis pain   Goals:  Short Term Goals: 4 weeks   Pt will be independent with home ex program   Pt will be able to increase bilateral hip flexion to 120 degrees  Increase hamstring length to -15 degrees  Increase hip internal rotation to 30 degrees left side  Be able to sit longer than 2 hours without piriformis pain .     Long Term Goals: 6 weeks   Pt will be able to sit greater than 4 hours without pain   Pt will be able to work pain free      Plan   Plan of care Certification: 1/20/2023 to 2/19/2022.    Outpatient Physical Therapy 2 times weekly for 6 weeks to include the following interventions: Manual Therapy, Neuromuscular Re-ed, Patient Education, Therapeutic Exercise, Ultrasound, and modalities as needed .     Plan of care has been reestablished with Florinda REBOLLAR and Bonita REOBLLAR.       Yannick Barbosa, PT

## 2023-01-23 ENCOUNTER — CLINICAL SUPPORT (OUTPATIENT)
Dept: REHABILITATION | Facility: HOSPITAL | Age: 58
End: 2023-01-23
Payer: COMMERCIAL

## 2023-01-23 DIAGNOSIS — M25.552 HIP PAIN, CHRONIC, LEFT: Primary | ICD-10-CM

## 2023-01-23 DIAGNOSIS — G89.29 HIP PAIN, CHRONIC, LEFT: Primary | ICD-10-CM

## 2023-01-23 PROCEDURE — 97035 APP MDLTY 1+ULTRASOUND EA 15: CPT | Mod: PN

## 2023-01-23 PROCEDURE — 97110 THERAPEUTIC EXERCISES: CPT | Mod: PN

## 2023-01-23 NOTE — PROGRESS NOTES
Physical Therapy Treatment Note     Name: Maranda Clark  [facility] Number: [id number]    Therapy Diagnosis:   Encounter Diagnosis   Name Primary?    Hip pain, chronic, left Yes     Physician: Ashley Rodriguez MD    Visit Date: 1/23/2023  Physician Orders: PT Eval and Treat    Medical Diagnosis from Referral: left hip pain   Evaluation Date: 1/20/2023  Updated Plan of Care Due : 2/19/2022  Authorization Period Expiration: [employer]   Plan of Care Expiration: end of the year   Visit # / Visits authorized: 2/ 30     PTA Visit #: 0    Time In: 845  Time Out: 930  Total Billable Time: 45 minutes    Precautions: Standard       Subjective     Pt reports: pt states stretching made him pretty sore. .  He was compliant with home exercise program.  Response to previous treatment: improved range of motion   Functional change: improving     Pain: 6/10  Location: left hip greater trochanteric bursitis and piriformis       Objective     Maranda received therapeutic exercises to develop strength, endurance, ROM, and flexibility for 35 minutes including:  Bike x 5 min   Slant board with scap retract and glute sets x 12 reps   Pt performed double support leg press 70 lb x 20reps   Double support calf press 70 x 15 reps   Bilateral hip abd 30lb x 12 reps   Bilateral hip add 30lb x 12 reps   Bilateral hip flexion 30lb x 12 reps   Left it band stretch standing x 5 reps with 30 sec hold   Physioball hamstring stretch x 15 reps   Physioball trunk rotation x 15 reps   Double knee to chest x 5 reps   Bilateral single knee to chest x 5 reps   Bilateral piriformis stretch x 5 reps   Fig 4 piriformis stretch   Left hamstring stretch x 5 reps x 15 sec    ROM   Flexion    right hip     120            left hip 108  Hamstring   right      -20                  left   -25         Pt received cont US to left it band , greater trochanteric bursitis, and piriformis x 8 min @ 2.2 intensity @ 1 mhz       Home Exercises Provided and Patient Education  Provided     Education provided: cont home ex program     Written Home Exercises Provided: Patient instructed to cont prior HEP.  Exercises were reviewed and Maranda was able to demonstrate them prior to the end of the session.  Maranda demonstrated good  understanding of the education provided.     See EMR under Patient Instructions for exercises provided prior visit.    Assessment     Pt improving with flexibility   Maranda Is progressing well towards his goals.   Pt prognosis is Excellent.     Pt will continue to benefit from skilled outpatient physical therapy to address the deficits listed in the problem list box on initial evaluation, provide pt/family education and to maximize pt's level of independence in the home and community environment.     Pt's spiritual, cultural and educational needs considered and pt agreeable to plan of care and goals.     Anticipated Barriers for therapy: decreased hip internal rotation and pain with palpation of left piriformis, greater trochanteric bursitis pain   Goals:  Short Term Goals: 4 weeks   Pt will be independent with home ex program   Pt will be able to increase bilateral hip flexion to 120 degrees  Increase hamstring length to -15 degrees  Increase hip internal rotation to 30 degrees left side  Be able to sit longer than 2 hours without piriformis pain .      Long Term Goals: 6 weeks   Pt will be able to sit greater than 4 hours without pain   Pt will be able to work pain free        Plan   Plan of care Certification: 1/20/2023 to 2/19/2022.     Outpatient Physical Therapy 2 times weekly for 6 weeks to include the following interventions: Manual Therapy, Neuromuscular Re-ed, Patient Education, Therapeutic Exercise, Ultrasound, and modalities as needed .      Plan of care has been reestablished with Florinda REBOLLAR and Bonita REBOLLAR.           Yannick Barbosa, PT  1/23/2023

## 2023-01-25 ENCOUNTER — TELEPHONE (OUTPATIENT)
Dept: FAMILY MEDICINE | Facility: CLINIC | Age: 58
End: 2023-01-25
Payer: COMMERCIAL

## 2023-01-25 ENCOUNTER — HOSPITAL ENCOUNTER (OUTPATIENT)
Dept: RADIOLOGY | Facility: HOSPITAL | Age: 58
Discharge: HOME | End: 2023-01-25
Attending: FAMILY MEDICINE
Payer: COMMERCIAL

## 2023-01-25 DIAGNOSIS — M25.559 PAIN IN UNSPECIFIED HIP: ICD-10-CM

## 2023-01-25 RX ORDER — DIAZEPAM 10 MG/1
TABLET ORAL
Qty: 2 TABLET | Refills: 0 | Status: SHIPPED | OUTPATIENT
Start: 2023-01-25 | End: 2023-05-22

## 2023-01-25 NOTE — TELEPHONE ENCOUNTER
----- Message from Dahlia Sanchez RN sent at 1/25/2023  1:58 PM CST -----    ----- Message -----  From: Jeanette Whitaker  Sent: 1/25/2023   9:59 AM CST  To: [facility] Staff    Patient called he was not able to get his MRI today he said Dr. Rodriguez told him she would call him in something to relax him so he is able to get it he uses [facility] pharmacy in [place]

## 2023-01-27 ENCOUNTER — CLINICAL SUPPORT (OUTPATIENT)
Dept: REHABILITATION | Facility: HOSPITAL | Age: 58
End: 2023-01-27
Payer: COMMERCIAL

## 2023-01-27 DIAGNOSIS — G89.29 HIP PAIN, CHRONIC, LEFT: Primary | ICD-10-CM

## 2023-01-27 DIAGNOSIS — M25.552 HIP PAIN, CHRONIC, LEFT: Primary | ICD-10-CM

## 2023-01-27 DIAGNOSIS — M25.652 DECREASED RANGE OF LEFT HIP MOVEMENT: ICD-10-CM

## 2023-01-27 PROCEDURE — 97110 THERAPEUTIC EXERCISES: CPT | Mod: PN,CQ

## 2023-01-27 NOTE — PROGRESS NOTES
Physical Therapy Treatment Note     Name: Marnada Clark  Clinic Number: [id number]    Therapy Diagnosis:   Encounter Diagnoses   Name Primary?    Hip pain, chronic, left Yes    Decreased range of left hip movement      Physician: Ashley Rodriguez MD    Visit Date: 1/27/2023  Physician Orders: PT Eval and Treat    Medical Diagnosis from Referral: left hip pain   Evaluation Date: 1/20/2023  Updated Plan of Care Due : 2/19/2022  Authorization Period Expiration: [employer]   Plan of Care Expiration: end of the year   Visit # / Visits authorized: 3/ 30  PTA Visit #: 1    Time In: 1015  Time Out: 1153  Total Billable Time: 38 minutes    Precautions: Standard   Plan of care reviewed with Yannick Barbosa PT.    Subjective     Pt reports: I'm supposed to get MRI today, had to reschedule form Wednesday because they needed to get me some med to relax. I was too scared to do it.  He was compliant with home exercise program.  Response to previous treatment: no difficulty  Functional change: less pain    Pain: 5/10  Location: left hip greater trochanteric bursitis and piriformis       Objective     Maranda received therapeutic exercises to develop strength, endurance, ROM, and flexibility for 38 minutes including:  Bike x 5 min   Slant board with scap retract and glute sets x 2'   Pt performed double support leg press 80 lb x 20reps   Double support calf press 80 x 15 reps   Bilateral hip abd 30lb x 12 reps   Bilateral hip add 30lb x 12 reps   Bilateral hip flexion 30lb x 12 reps   Left it band stretch standing x 5 reps with 30 sec hold   Physioball hamstring stretch x 15 reps   Physioball trunk rotation x 15 reps   Bridging x 10  Bilateral single knee to chest x 5 reps   Bilateral piriformis stretch x 5 reps   Fig 4 piriformis stretch       ROM   Flexion    right hip     120            left hip 109  Hamstring   right      -20                  left   -23         Pt received cont US to left it band , greater trochanteric bursitis, and  piriformis x min @ intensity @  mhz       Home Exercises Provided and Patient Education Provided     Education provided: cont home ex program     Written Home Exercises Provided: Patient instructed to cont prior HEP.  Exercises were reviewed and Maranda was able to demonstrate them prior to the end of the session.  Maranda demonstrated good  understanding of the education provided.     See EMR under Patient Instructions for exercises provided prior visit.    Assessment     Patient c/o entire hip joint pain with exercises, no change during treatment.  Maranda Is progressing well towards his goals.   Pt prognosis is Excellent.     Pt will continue to benefit from skilled outpatient physical therapy to address the deficits listed in the problem list box on initial evaluation, provide pt/family education and to maximize pt's level of independence in the home and community environment.     Pt's spiritual, cultural and educational needs considered and pt agreeable to plan of care and goals.     Anticipated Barriers for therapy: decreased hip internal rotation and pain with palpation of left piriformis, greater trochanteric bursitis pain   Goals:  Short Term Goals: 4 weeks   Pt will be independent with home ex program   Pt will be able to increase bilateral hip flexion to 120 degrees  Increase hamstring length to -15 degrees  Increase hip internal rotation to 30 degrees left side  Be able to sit longer than 2 hours without piriformis pain .      Long Term Goals: 6 weeks   Pt will be able to sit greater than 4 hours without pain   Pt will be able to work pain free        Plan   Plan of care Certification: 1/20/2023 to 2/19/2022.     Outpatient Physical Therapy 2 times weekly for 6 weeks to include the following interventions: Manual Therapy, Neuromuscular Re-ed, Patient Education, Therapeutic Exercise, Ultrasound, and modalities as needed .        Porsha Pantoja, PTA  1/27/2023

## 2023-02-01 DIAGNOSIS — I10 PRIMARY HYPERTENSION: ICD-10-CM

## 2023-02-01 RX ORDER — AMLODIPINE BESYLATE 10 MG/1
10 TABLET ORAL NIGHTLY
Qty: 90 TABLET | Refills: 1 | Status: SHIPPED | OUTPATIENT
Start: 2023-02-01 | End: 2023-02-13 | Stop reason: SDUPTHER

## 2023-02-06 ENCOUNTER — CLINICAL SUPPORT (OUTPATIENT)
Dept: REHABILITATION | Facility: HOSPITAL | Age: 58
End: 2023-02-06
Payer: COMMERCIAL

## 2023-02-06 DIAGNOSIS — M25.552 HIP PAIN, CHRONIC, LEFT: Primary | ICD-10-CM

## 2023-02-06 DIAGNOSIS — M25.652 DECREASED RANGE OF LEFT HIP MOVEMENT: ICD-10-CM

## 2023-02-06 DIAGNOSIS — G89.29 HIP PAIN, CHRONIC, LEFT: Primary | ICD-10-CM

## 2023-02-06 PROCEDURE — 97140 MANUAL THERAPY 1/> REGIONS: CPT | Mod: PN,CQ

## 2023-02-06 PROCEDURE — 97110 THERAPEUTIC EXERCISES: CPT | Mod: PN,CQ

## 2023-02-06 NOTE — PROGRESS NOTES
Physical Therapy Treatment Note     Name: Maranda Clark  Clinic Number: [id number]    Therapy Diagnosis:   Encounter Diagnoses   Name Primary?    Hip pain, chronic, left Yes    Decreased range of left hip movement      Physician: Ashley Rodriguez MD    Visit Date: 2/6/2023  Physician Orders: PT Eval and Treat    Medical Diagnosis from Referral: left hip pain   Evaluation Date: 1/20/2023  Updated Plan of Care Due : 2/19/2022  Authorization Period Expiration: [employer]   Plan of Care Expiration: end of the year   Visit # / Visits authorized: 4/ 30  PTA Visit #: 2    Time In: 0930  Time Out: 1015  Total Billable Time: 45 minutes    Precautions: Standard   Plan of care reviewed with Yannick Barbosa PT.    Subjective   .  Pt reports: will reschedule MRI, was more active at home after last session  He was compliant with home exercise program.  Response to previous treatment: muscle soreness  Functional change: bending and walking cause increased pain    Pain: 5/10 no pain meds  Location: left hip anterior to posterior      Objective     Maranda received therapeutic exercises to develop strength, endurance, ROM, and flexibility for 38 minutes including:  Bike x 5 min   Slant board with scap retract and glute sets x 2'   Hip flexor stretch on step x 10 with 5 sec hold  Pt performed double support leg press 80 lb x 20reps   Double support calf press 80 x 15 reps   Bilateral hip abd 30lb x 12 reps   Bilateral hip add 30lb x  reps   Bilateral hip flexion 30lb x  reps   Gurwinder hip extension 30# x 10 reps  Left it band stretch standing x 5 reps with 30 sec hold   Physioball hamstring stretch x 15 reps   Physioball trunk rotation x 15 reps   Bridging x 10  Bilateral single knee to chest x 5 reps   Bilateral piriformis stretch x 5 reps   Fig 4 piriformis stretch     Long axis distraction with oscillations x 5 min  ROM   Flexion    right hip     120            left hip 109  Hamstring   right      -20                  left   -23          Pt received cont US to left it band , greater trochanteric bursitis, and piriformis x min @ intensity @  mhz       Home Exercises Provided and Patient Education Provided     Education provided: cont home ex program     Written Home Exercises Provided: Patient instructed to cont prior HEP.  Exercises were reviewed and Maranda was able to demonstrate them prior to the end of the session.  Maranda demonstrated good  understanding of the education provided.     See EMR under Patient Instructions for exercises provided prior visit.    Assessment     Case conference with Yannick Barbosa PT for initial PTA visit. Pt with some relief from session today. Tight and guarded with left hip motion during manual stretches- compensating with lumbar rotation.  Maranda Is progressing well towards his goals.   Pt prognosis is Excellent.     Pt will continue to benefit from skilled outpatient physical therapy to address the deficits listed in the problem list box on initial evaluation, provide pt/family education and to maximize pt's level of independence in the home and community environment.     Pt's spiritual, cultural and educational needs considered and pt agreeable to plan of care and goals.     Anticipated Barriers for therapy: decreased hip internal rotation and pain with palpation of left piriformis, greater trochanteric bursitis pain   Goals:  Short Term Goals: 4 weeks   Pt will be independent with home ex program   Pt will be able to increase bilateral hip flexion to 120 degrees  Increase hamstring length to -15 degrees  Increase hip internal rotation to 30 degrees left side  Be able to sit longer than 2 hours without piriformis pain .      Long Term Goals: 6 weeks   Pt will be able to sit greater than 4 hours without pain   Pt will be able to work pain free        Plan   Plan of care Certification: 1/20/2023 to 2/19/2022.     Outpatient Physical Therapy 2 times weekly for 6 weeks to include the following interventions: Manual  Therapy, Neuromuscular Re-ed, Patient Education, Therapeutic Exercise, Ultrasound, and modalities as needed .        KETURAH Goodwin  2/6/2023

## 2023-02-09 ENCOUNTER — PATIENT OUTREACH (OUTPATIENT)
Dept: ADMINISTRATIVE | Facility: HOSPITAL | Age: 58
End: 2023-02-09

## 2023-02-10 ENCOUNTER — CLINICAL SUPPORT (OUTPATIENT)
Dept: REHABILITATION | Facility: HOSPITAL | Age: 58
End: 2023-02-10
Payer: COMMERCIAL

## 2023-02-10 DIAGNOSIS — G89.29 HIP PAIN, CHRONIC, LEFT: Primary | ICD-10-CM

## 2023-02-10 DIAGNOSIS — M25.552 HIP PAIN, CHRONIC, LEFT: Primary | ICD-10-CM

## 2023-02-10 DIAGNOSIS — M25.652 DECREASED RANGE OF LEFT HIP MOVEMENT: ICD-10-CM

## 2023-02-10 PROCEDURE — 97110 THERAPEUTIC EXERCISES: CPT | Mod: PN,CQ

## 2023-02-10 NOTE — PROGRESS NOTES
Physical Therapy Treatment Note     Name: Maranda Clark  Clinic Number: [id number]    Therapy Diagnosis:   Encounter Diagnoses   Name Primary?    Hip pain, chronic, left Yes    Decreased range of left hip movement      Physician: Ashley Rodriguez MD    Visit Date: 2/10/2023  Physician Orders: PT Eval and Treat    Medical Diagnosis from Referral: left hip pain   Evaluation Date: 1/20/2023  Updated Plan of Care Due : 2/19/2022  Authorization Period Expiration: [employer]   Plan of Care Expiration: end of the year   Visit # / Visits authorized: 6/ 30  PTA Visit #: 3    Time In: 0930  Time Out: 1008  Total Billable Time: 38 minutes    Precautions: Standard   Plan of care reviewed with Yannick Barbosa PT.    Subjective   .  Pt reports: I'm getting an MRI Monday, I've just had myself so worked up about getting it.  He was compliant with home exercise program.  Response to previous treatment: muscle soreness  Functional change: bending and walking cause increased pain    Pain: 6/10   Location: left hip anterior to posterior      Objective     Maranda received therapeutic exercises to develop strength, endurance, ROM, and flexibility for 38 minutes including:  Bike x 5 min   Slant board with scap retract and glute sets x 2'   Hip flexor stretch on step x 10 with 5 sec hold  Pt performed double support leg press 80 lb x 20reps   Double support calf press 85# x 20 reps   Double support calf presses 15 x 85#  Bilateral hip abd 30lb x 12 reps   Bilateral hip add 30lb x  reps   Bilateral hip flexion 30lb x  reps   Gurwinder hip extension 30# x 10 reps  Left it band stretch standing x 5 reps with 30 sec hold   Physioball trunk rotation x 15 reps   Bridging x 10  Bilateral single knee to chest x 5 reps   Bilateral piriformis stretch x 5 reps   Supine frog stretch x 10    ROM   Flexion    right hip     119          left  109  Hamstring   right      -20           left   -21         Home Exercises Provided and Patient Education Provided      Education provided: cont home ex program     Written Home Exercises Provided: Patient instructed to cont prior HEP.  Exercises were reviewed and Maranda was able to demonstrate them prior to the end of the session.  Maranda demonstrated good  understanding of the education provided.     See EMR under Patient Instructions for exercises provided prior visit.    Assessment     Patient declined modalities, has tight left hip internal rotation and external rotation rotation with c/o joint pain with with stretching.  Maranda Is progressing well towards his goals.   Pt prognosis is Excellent.     Pt will continue to benefit from skilled outpatient physical therapy to address the deficits listed in the problem list box on initial evaluation, provide pt/family education and to maximize pt's level of independence in the home and community environment.     Pt's spiritual, cultural and educational needs considered and pt agreeable to plan of care and goals.     Anticipated Barriers for therapy: decreased hip internal rotation and pain with palpation of left piriformis, greater trochanteric bursitis pain   Goals:  Short Term Goals: 4 weeks   Pt will be independent with home ex program   Pt will be able to increase bilateral hip flexion to 120 degrees  Increase hamstring length to -15 degrees  Increase hip internal rotation to 30 degrees left side  Be able to sit longer than 2 hours without piriformis pain .      Long Term Goals: 6 weeks   Pt will be able to sit greater than 4 hours without pain   Pt will be able to work pain free        Plan   Plan of care Certification: 1/20/2023 to 2/19/2022.     Outpatient Physical Therapy 2 times weekly for 6 weeks to include the following interventions: Manual Therapy, Neuromuscular Re-ed, Patient Education, Therapeutic Exercise, Ultrasound, and modalities as needed .      Porsha Pantoja, PTA   2/10/2023

## 2023-02-13 ENCOUNTER — OFFICE VISIT (OUTPATIENT)
Dept: FAMILY MEDICINE | Facility: CLINIC | Age: 58
End: 2023-02-13
Payer: COMMERCIAL

## 2023-02-13 ENCOUNTER — CLINICAL SUPPORT (OUTPATIENT)
Dept: REHABILITATION | Facility: HOSPITAL | Age: 58
End: 2023-02-13
Payer: COMMERCIAL

## 2023-02-13 VITALS
TEMPERATURE: 99 F | HEART RATE: 62 BPM | WEIGHT: 229 LBS | BODY MASS INDEX: 31.02 KG/M2 | RESPIRATION RATE: 20 BRPM | HEIGHT: 72 IN | SYSTOLIC BLOOD PRESSURE: 153 MMHG | DIASTOLIC BLOOD PRESSURE: 93 MMHG | OXYGEN SATURATION: 99 %

## 2023-02-13 DIAGNOSIS — M25.652 DECREASED RANGE OF LEFT HIP MOVEMENT: ICD-10-CM

## 2023-02-13 DIAGNOSIS — Z13.220 ENCOUNTER FOR SCREENING FOR LIPID DISORDER: ICD-10-CM

## 2023-02-13 DIAGNOSIS — M25.552 LEFT HIP PAIN: ICD-10-CM

## 2023-02-13 DIAGNOSIS — I10 PRIMARY HYPERTENSION: ICD-10-CM

## 2023-02-13 DIAGNOSIS — Z00.01 ENCOUNTER FOR GENERAL ADULT MEDICAL EXAMINATION WITH ABNORMAL FINDINGS: Primary | ICD-10-CM

## 2023-02-13 DIAGNOSIS — M25.552 HIP PAIN, CHRONIC, LEFT: Primary | ICD-10-CM

## 2023-02-13 DIAGNOSIS — Z13.1 DIABETES MELLITUS SCREENING: ICD-10-CM

## 2023-02-13 DIAGNOSIS — G89.29 HIP PAIN, CHRONIC, LEFT: Primary | ICD-10-CM

## 2023-02-13 LAB
CHOLEST SERPL-MCNC: 152 MG/DL (ref 0–200)
CHOLEST/HDLC SERPL: 2.8 {RATIO}
GLUCOSE SERPL-MCNC: 116 MG/DL (ref 74–106)
HDLC SERPL-MCNC: 54 MG/DL (ref 40–60)
LDLC SERPL CALC-MCNC: 88 MG/DL
LDLC/HDLC SERPL: 1.6 {RATIO}
NONHDLC SERPL-MCNC: 98 MG/DL
TRIGL SERPL-MCNC: 52 MG/DL (ref 35–150)
VLDLC SERPL-MCNC: 10 MG/DL

## 2023-02-13 PROCEDURE — 3077F SYST BP >= 140 MM HG: CPT | Mod: ,,, | Performed by: FAMILY MEDICINE

## 2023-02-13 PROCEDURE — 3044F PR MOST RECENT HEMOGLOBIN A1C LEVEL <7.0%: ICD-10-PCS | Mod: ,,, | Performed by: FAMILY MEDICINE

## 2023-02-13 PROCEDURE — 80061 LIPID PANEL: ICD-10-PCS | Mod: ,,, | Performed by: CLINICAL MEDICAL LABORATORY

## 2023-02-13 PROCEDURE — 99396 PR PREVENTIVE VISIT,EST,40-64: ICD-10-PCS | Mod: ,,, | Performed by: FAMILY MEDICINE

## 2023-02-13 PROCEDURE — 1159F MED LIST DOCD IN RCRD: CPT | Mod: ,,, | Performed by: FAMILY MEDICINE

## 2023-02-13 PROCEDURE — 3044F HG A1C LEVEL LT 7.0%: CPT | Mod: ,,, | Performed by: FAMILY MEDICINE

## 2023-02-13 PROCEDURE — 82947 ASSAY GLUCOSE BLOOD QUANT: CPT | Mod: ,,, | Performed by: CLINICAL MEDICAL LABORATORY

## 2023-02-13 PROCEDURE — 3080F PR MOST RECENT DIASTOLIC BLOOD PRESSURE >= 90 MM HG: ICD-10-PCS | Mod: ,,, | Performed by: FAMILY MEDICINE

## 2023-02-13 PROCEDURE — 3008F PR BODY MASS INDEX (BMI) DOCUMENTED: ICD-10-PCS | Mod: ,,, | Performed by: FAMILY MEDICINE

## 2023-02-13 PROCEDURE — 1160F PR REVIEW ALL MEDS BY PRESCRIBER/CLIN PHARMACIST DOCUMENTED: ICD-10-PCS | Mod: ,,, | Performed by: FAMILY MEDICINE

## 2023-02-13 PROCEDURE — 80061 LIPID PANEL: CPT | Mod: ,,, | Performed by: CLINICAL MEDICAL LABORATORY

## 2023-02-13 PROCEDURE — 97110 THERAPEUTIC EXERCISES: CPT | Mod: PN,CQ

## 2023-02-13 PROCEDURE — 99396 PREV VISIT EST AGE 40-64: CPT | Mod: ,,, | Performed by: FAMILY MEDICINE

## 2023-02-13 PROCEDURE — 3077F PR MOST RECENT SYSTOLIC BLOOD PRESSURE >= 140 MM HG: ICD-10-PCS | Mod: ,,, | Performed by: FAMILY MEDICINE

## 2023-02-13 PROCEDURE — 1159F PR MEDICATION LIST DOCUMENTED IN MEDICAL RECORD: ICD-10-PCS | Mod: ,,, | Performed by: FAMILY MEDICINE

## 2023-02-13 PROCEDURE — 82947 GLUCOSE, FASTING: ICD-10-PCS | Mod: ,,, | Performed by: CLINICAL MEDICAL LABORATORY

## 2023-02-13 PROCEDURE — 3080F DIAST BP >= 90 MM HG: CPT | Mod: ,,, | Performed by: FAMILY MEDICINE

## 2023-02-13 PROCEDURE — 3008F BODY MASS INDEX DOCD: CPT | Mod: ,,, | Performed by: FAMILY MEDICINE

## 2023-02-13 PROCEDURE — 1160F RVW MEDS BY RX/DR IN RCRD: CPT | Mod: ,,, | Performed by: FAMILY MEDICINE

## 2023-02-13 RX ORDER — METOPROLOL SUCCINATE 200 MG/1
400 TABLET, EXTENDED RELEASE ORAL DAILY
Qty: 180 TABLET | Refills: 1 | Status: SHIPPED | OUTPATIENT
Start: 2023-02-13 | End: 2023-09-29 | Stop reason: SDUPTHER

## 2023-02-13 RX ORDER — TRIAMCINOLONE ACETONIDE 0.25 MG/G
CREAM TOPICAL 2 TIMES DAILY
Qty: 80 G | Refills: 0 | Status: SHIPPED | OUTPATIENT
Start: 2023-02-13 | End: 2023-05-22 | Stop reason: SDUPTHER

## 2023-02-13 RX ORDER — MELOXICAM 15 MG/1
15 TABLET ORAL DAILY
Qty: 90 TABLET | Refills: 1 | Status: SHIPPED | OUTPATIENT
Start: 2023-02-13 | End: 2023-08-12

## 2023-02-13 RX ORDER — AMLODIPINE BESYLATE 10 MG/1
10 TABLET ORAL NIGHTLY
Qty: 90 TABLET | Refills: 1 | Status: SHIPPED | OUTPATIENT
Start: 2023-02-13 | End: 2023-09-29 | Stop reason: SDUPTHER

## 2023-02-13 NOTE — PROGRESS NOTES
Subjective     Patient ID: Maranda Clark is a 57 y.o. male.    Chief Complaint: Healthy You    He just took his Antihypertensive medications as he was walking in     Hypertension  This is a chronic problem. Pertinent negatives include no anxiety, blurred vision, chest pain, headaches, malaise/fatigue, neck pain, orthopnea, palpitations, peripheral edema, PND or shortness of breath.   Review of Systems   Constitutional:  Negative for activity change, fatigue and malaise/fatigue.   Eyes:  Negative for blurred vision.   Respiratory:  Negative for shortness of breath.    Cardiovascular:  Negative for chest pain, palpitations, orthopnea, leg swelling and PND.   Gastrointestinal:  Negative for change in bowel habit, constipation and change in bowel habit.   Endocrine: Negative for polydipsia, polyphagia and polyuria.   Musculoskeletal:  Negative for gait problem, neck pain and neck stiffness.   Integumentary:  Negative for rash.   Neurological:  Negative for headaches.   Psychiatric/Behavioral:  Negative for behavioral problems and suicidal ideas.      Tobacco Use: Low Risk     Smoking Tobacco Use: Never    Smokeless Tobacco Use: Never    Passive Exposure: Not on file     Review of patient's allergies indicates:  No Known Allergies  Current Outpatient Medications   Medication Instructions    amLODIPine (NORVASC) 10 mg, Oral, Nightly    diazePAM (VALIUM) 10 MG Tab Take 1 tablet 45  minutes before procedure, can take another if needed    meloxicam (MOBIC) 15 mg, Oral, Daily    metoprolol succinate (TOPROL-XL) 400 mg, Oral, Daily    triamcinolone acetonide 0.025% (KENALOG) 0.025 % cream Topical (Top), 2 times daily     Medications Discontinued During This Encounter   Medication Reason    triamcinolone acetonide 0.025% (KENALOG) 0.025 % cream Reorder    meloxicam (MOBIC) 15 MG tablet Reorder    amLODIPine (NORVASC) 10 MG tablet Reorder    metoprolol succinate (TOPROL-XL) 200 MG 24 hr tablet Reorder       History reviewed. No  pertinent past medical history.  Health Maintenance Topics with due status: Not Due       Topic Last Completion Date    Colorectal Cancer Screening 11/07/2016    TETANUS VACCINE 01/27/2022    Hemoglobin A1c (Prediabetes) 01/09/2023    Lipid Panel 01/09/2023     Immunization History   Administered Date(s) Administered    COVID-19, MRNA, LN-S, PF (Pfizer) (Purple Cap) 08/29/2021, 09/19/2021    Tdap 01/27/2022       Objective     Body mass index is 31.06 kg/m².  Wt Readings from Last 3 Encounters:   02/13/23 103.9 kg (229 lb)   01/09/23 101.6 kg (224 lb)   06/27/22 102.1 kg (225 lb)     Ht Readings from Last 3 Encounters:   02/13/23 6' (1.829 m)   01/09/23 6' (1.829 m)   06/27/22 6' (1.829 m)     BP Readings from Last 3 Encounters:   02/13/23 (!) 153/93   01/09/23 (!) 154/96   06/27/22 (!) 136/92     Temp Readings from Last 3 Encounters:   02/13/23 98.8 °F (37.1 °C) (Oral)   01/09/23 98.5 °F (36.9 °C) (Oral)   06/27/22 98.7 °F (37.1 °C) (Oral)     Pulse Readings from Last 3 Encounters:   02/13/23 62   01/09/23 (!) 57   06/27/22 (!) 52     Resp Readings from Last 3 Encounters:   02/13/23 20   01/09/23 18   06/27/22 20     PF Readings from Last 3 Encounters:   No data found for PF       Physical Exam  Constitutional:       Appearance: Normal appearance. He is obese. He is not ill-appearing.   Cardiovascular:      Rate and Rhythm: Normal rate and regular rhythm.   Pulmonary:      Effort: Pulmonary effort is normal.      Breath sounds: Normal breath sounds.   Neurological:      Mental Status: He is alert and oriented to person, place, and time.   Psychiatric:         Mood and Affect: Mood normal.         Behavior: Behavior normal.         Judgment: Judgment normal.       Assessment and Plan     Problem List Items Addressed This Visit    None  Visit Diagnoses       Encounter for general adult medical examination with abnormal findings    -  Primary    Encounter for screening for lipid disorder        Relevant Orders     Lipid Panel    Diabetes mellitus screening        Relevant Orders    Glucose, Fasting    Primary hypertension        Relevant Medications    amLODIPine (NORVASC) 10 MG tablet    metoprolol succinate (TOPROL-XL) 200 MG 24 hr tablet    Left hip pain        Relevant Medications    meloxicam (MOBIC) 15 MG tablet            Plan:   He is going to try to improve healthy choices, reduce calorie intake, try to do more physical activity to lose weight. Recommended at least 150 minutes a week with resistance training as tolerated  Monitor weight  attend regularly scheduled apts   Schedule yearly eye exam  Take medications as prescirved  Improve nutrition, decrease car intake, decrease fast food  Stay away from tobacco products  Sun screen recommended and discuseed        I have reviewed the medications, allergies, and problem list.     Goal Actions:    What type of visit is the patient here for today?: Healthy You  Does the patient consent to enroll in [place] [facility] [employer]?: Yes  Is this a Wellness Follow Up?: No  What is your overall wellness goal? (select at least one): Lifestyle modifications, Improve overall health  Choose 3: Lifestyle, Exercise, Biometric  Biometric Actions: Monitor and record \report B\P log, Attend regularly scheduled office visits  Lifestyle Actions : Take medications as prescribed, Schedule colonoscopy, sigmoidoscopy, or Colorguard if applicable  Exercise Actions: Recommend physical activity 30 minutes per day 3-5 times/week    Ashley Rodriguez MD

## 2023-02-13 NOTE — PATIENT INSTRUCTIONS
"Patient Education       Diet and Health   The Basics   Written by the doctors and editors at [facility]   Why is it important to eat a healthy diet? -- It's important to eat a healthy diet because eating the right foods can keep you healthy now and later on in life.  Which foods are especially healthy? -- Foods that are especially healthy include:  Fruits and vegetables - Eating a diet with lots of fruits and vegetables can help prevent heart disease and strokes. It might also help prevent certain types of cancers. Try to eat fruits and vegetables at each meal and also for snacks. If you don't have fresh fruits and vegetables available, you can eat frozen or canned ones instead. Doctors recommend eating at least 2 1/2 servings of vegetables and 2 servings of fruits each day.  Foods with fiber - Eating foods with a lot of fiber can help prevent heart disease and strokes. If you have type 2 diabetes, it can also help control your blood sugar. Foods that have a lot of fiber include vegetables, fruits, beans, nuts, oatmeal, and whole grain breads and cereals. You can tell how much fiber is in a food by reading the nutrition label (figure 1). Doctors recommend eating 25 to 36 grams of fiber each day.  Foods with folate (also called folic acid) - Folate is a vitamin that is important for pregnant people, since it helps prevent certain birth defects. Anyone who could get pregnant should get at least 400 micrograms of folic acid daily, whether or not they are actively trying to get pregnant. Folate is found in many breakfast cereals, oranges, orange juice, and green leafy vegetables.  Foods with calcium and vitamin D - Babies, children, and adults need calcium and vitamin D to help keep their bones strong. Adults also need calcium and vitamin D to help prevent osteoporosis. Osteoporosis is a condition that causes bones to get "thin" and break more easily than usual. Different foods and drinks have calcium and vitamin D in " "them (figure 2). People who don't get enough calcium and vitamin D in their diet might need to take a supplement.  Foods with protein - Protein helps your muscles stay strong. Healthy foods with a lot of protein include chicken, fish, eggs, beans, nuts, and soy products. Red meat also has a lot of protein, but it also contains fats, which can be unhealthy.  Some experts recommend a "Mediterranean diet." This involves eating a lot of fruits, vegetables, nuts, whole grains, and olive oil. It also includes some fish, poultry, and dairy products, but not a lot of red meat. Eating this way can help your overall health, and might even lower your risk of having a stroke.  What foods should I avoid or limit? -- To eat a healthy diet, there are some things you should avoid or limit. They include:   Fats - There are different types of fats. Some types of fats are better for your body than others.  Trans fats are especially unhealthy. They are found in margarines, many fast foods, and some store-bought baked goods. Trans fats can raise your cholesterol level and your increase your chance of getting heart disease. You should avoid eating foods with these types of fats.  The type of "polyunsaturated" fats found in fish seems to be healthy and can reduce your chance of getting heart disease. Other polyunsaturated fats might also be good for your health. When you cook, it's best to use oils with healthier fats, such as olive oil and canola oil.  Sugar - To have a healthy diet, it's important to limit or avoid sugar, sweets, and refined grains. Refined grains are found in white bread, white rice, most forms of pasta, and most packaged "snack" foods. Whole grains, such as whole-wheat bread and brown rice, have more fiber and are better for your health.  Avoiding sugar-sweetened beverages, like soda and sports drinks, can also help improve your health.  Red meat - Studies have shown that eating a lot of red meat can increase your " "risk of certain health problems, including heart disease and cancer. You should limit the amount of red meat that you eat.  Can I drink alcohol as part of a healthy diet? -- People who drink a small amount of alcohol each day might have a lower chance of getting heart disease. But drinking alcohol can lead to problems. For example, it can raise a person's chances of getting liver disease and certain types of cancers. In women, even 1 drink a day can increase the risk of getting breast cancer.  Most doctors recommend that adult women not have more than 1 drink a day and that adult men not have more than 2 drinks a day. The limits are different because most women's bodies take longer to break down alcohol.  How many calories do I need each day? -- The number of calories you need each day depends on your weight, height, age, sex, and how active you are.  Your doctor or nurse can tell you how many calories you should eat each day. If you are trying to lose weight, you should eat fewer calories each day.  What if I have questions? -- If you have questions about which foods you should or should not eat, ask your doctor or nurse. The right diet for you will depend, in part, on your health and any medical conditions you have.  All topics are updated as new evidence becomes available and our peer review process is complete.  This topic retrieved from [employer] on: Sep 21, 2021.  Topic 52757 Version 20.0  Release: 29.4.2 - C29.263  © 2021 UpToDate, Inc. and/or its affiliates. All rights reserved.  figure 1: Nutrition label - fiber     This is an example of a nutrition label. To figure out how much fiber is in a food, look for the line that says "Dietary Fiber." It's also important to look at the serving size. This food has 7 grams of fiber in each serving, and each serving is 1 cup.  Graphic 76259 Version 7.0    figure 2: Foods and drinks with calcium and vitamin D     Foods rich in calcium include ice cream, soy milk, breads, " "kale, broccoli, milk, cheese, cottage cheese, almonds, yogurt, ready-to-eat cereals, beans, and tofu. Foods rich in vitamin D include milk, fortified plant-based "milks" (soy, almond), canned tuna fish, cod liver oil, yogurt, ready-to-eat-cereals, cooked salmon, canned sardines, mackerel, and eggs. Some of these foods are rich in both.  Graphic 32729 Version 4.0    Consumer Information Use and Disclaimer   This information is not specific medical advice and does not replace information you receive from your health care provider. This is only a brief summary of general information. It does NOT include all information about conditions, illnesses, injuries, tests, procedures, treatments, therapies, discharge instructions or life-style choices that may apply to you. You must talk with your health care provider for complete information about your health and treatment options. This information should not be used to decide whether or not to accept your health care provider's advice, instructions or recommendations. Only your health care provider has the knowledge and training to provide advice that is right for you. The use of this information is governed by the [employer] End User License Agreement, available at https://www.[employer]Span/en/solutions/[place]/about/melissa.The use of [employer] content is governed by the [employer] Terms of Use. ©2021 UpToDate, Inc. All rights reserved.  Copyright   © 2021 UpToDate, Inc. and/or its affiliates. All rights reserved.    "

## 2023-02-13 NOTE — PROGRESS NOTES
Physical Therapy Treatment Note     Name: Maranda Clark  Clinic Number: [id number]    Therapy Diagnosis:   Encounter Diagnoses   Name Primary?    Hip pain, chronic, left Yes    Decreased range of left hip movement      Physician: Ashley Rodriguez MD    Visit Date: 2/13/2023  Physician Orders: PT Eval and Treat    Medical Diagnosis from Referral: left hip pain   Evaluation Date: 1/20/2023  Updated Plan of Care Due : 2/19/2022  Authorization Period Expiration: [employer] cross   Plan of Care Expiration: end of the year   Visit # / Visits authorized: 6/ 30  PTA Visit #: 4    Time In: 0930  Time Out: 1015  Total Billable Time: \45 minutes    Precautions: Standard   Plan of care reviewed with Yannick Barbosa PT.    Subjective   .  Pt reports: I'm getting an MRI Monday, I've just had myself so worked up about getting it.  He was compliant with home exercise program.  Response to previous treatment: muscle soreness  Functional change: bending and walking cause increased pain    Pain: 6/10   Location: left hip anterior to posterior      Objective     Maranda received therapeutic exercises to develop strength, endurance, ROM, and flexibility for 38 minutes including:  Bike x 5 min   Slant board with scap retract and glute sets x 2'   Hip flexor stretch on step x 10 with 5 sec hold  HAMSTRING stretch on step 3 x 30 sec  Red band hip abduction  x 10 Gurwinder  Red band hip extension x 10 Gurwinder   Pt performed double support leg press 80 lb x 20reps NOT THIS VISIT   Double support calf press 85# x 20 reps NOT THIS VISIT   Double support calf presses 15 x 85# NOT THIS VISIT   Bilateral hip abd 30lb x 12 reps NOT THIS VISIT   Bilateral hip add 30lb x  reps NOT THIS VISIT   Bilateral hip flexion 30lb x  reps NOT THIS VISIT   Gurwinder hip extension 30# x 10 reps NOT THIS VISIT   Left it band stretch standing x 5 reps with 30 sec hold   Physioball trunk rotation x 15 reps  NOT THIS VISIT   Bridging with blue band x 20  Hip flexion from hooklying blue x  20  Bilateral single knee to chest x 5 reps   Bilateral piriformis stretch x 5 reps   Supine frog stretch x 0    ROM   Flexion    right hip     119          left  109  Hamstring   right      -20           left   -21         Home Exercises Provided and Patient Education Provided     Education provided: cont home ex program     Written Home Exercises Provided: Patient instructed to cont prior HEP.  Exercises were reviewed and Maranda was able to demonstrate them prior to the end of the session.  Maranda demonstrated good  understanding of the education provided.     See EMR under Patient Instructions for exercises provided prior visit.    Assessment     Patient is gradually improving. Able to take a longer step without as much pain. Most discomfort with EXTERNAL ROTATION/INTERNAL ROTATION.  Maranda Is progressing well towards his goals.   Pt prognosis is Excellent.     Pt will continue to benefit from skilled outpatient physical therapy to address the deficits listed in the problem list box on initial evaluation, provide pt/family education and to maximize pt's level of independence in the home and community environment.     Pt's spiritual, cultural and educational needs considered and pt agreeable to plan of care and goals.     Anticipated Barriers for therapy: decreased hip internal rotation and pain with palpation of left piriformis, greater trochanteric bursitis pain   Goals:  Short Term Goals: 4 weeks   Pt will be independent with home ex program   Pt will be able to increase bilateral hip flexion to 120 degrees  Increase hamstring length to -15 degrees  Increase hip internal rotation to 30 degrees left side  Be able to sit longer than 2 hours without piriformis pain .      Long Term Goals: 6 weeks   Pt will be able to sit greater than 4 hours without pain   Pt will be able to work pain free        Plan   Plan of care Certification: 1/20/2023 to 2/19/2022.     Outpatient Physical Therapy 2 times weekly for 6 weeks  to include the following interventions: Manual Therapy, Neuromuscular Re-ed, Patient Education, Therapeutic Exercise, Ultrasound, and modalities as needed .      KETURAH Goodwin  2/13/2023

## 2023-02-21 ENCOUNTER — CLINICAL SUPPORT (OUTPATIENT)
Dept: REHABILITATION | Facility: HOSPITAL | Age: 58
End: 2023-02-21
Payer: COMMERCIAL

## 2023-02-21 DIAGNOSIS — M25.552 HIP PAIN, CHRONIC, LEFT: Primary | ICD-10-CM

## 2023-02-21 DIAGNOSIS — M25.652 DECREASED RANGE OF LEFT HIP MOVEMENT: ICD-10-CM

## 2023-02-21 DIAGNOSIS — G89.29 HIP PAIN, CHRONIC, LEFT: Primary | ICD-10-CM

## 2023-02-21 PROCEDURE — 97110 THERAPEUTIC EXERCISES: CPT | Mod: PN,CQ

## 2023-02-21 NOTE — PROGRESS NOTES
Physical Therapy Treatment Note     Name: Maranda Clark  [facility] Number: [id number]    Therapy Diagnosis:   Encounter Diagnoses   Name Primary?    Hip pain, chronic, left Yes    Decreased range of left hip movement      Physician: Ashley Rodriguez MD    Visit Date: 2/21/2023  Physician Orders: PT Eval and Treat    Medical Diagnosis from Referral: left hip pain   Evaluation Date: 1/20/2023  Updated Plan of Care Due : 2/19/2022  Authorization Period Expiration: blue cross   Plan of Care Expiration: end of the year   Visit # / Visits authorized: 7/ 30  PTA Visit #: 5    Time In: 1232  Time Out: 115  Total Billable Time: 43 minutes    Precautions: Standard     Subjective   .  Pt reports: The MRI just showed arthritis in hip  He was compliant with home exercise program.  Response to previous treatment: muscle soreness  Functional change: bending and walking cause increased pain    Pain: 6/10   Location: left hip anterior to posterior      Objective     Maranda received therapeutic exercises to develop strength, endurance, ROM, and flexibility for 38 minutes including:  Bike x 5 min   Slant board with scap retract and glute sets x 2'   HAMSTRING stretch on step 3 x 30 sec  Pt performed double support leg press 80 lb x 20 reps    Double support calf presses 15 x 85#   Bilateral hip abd 30lb x 12 reps   Bilateral hip add 30lb x  reps    Bilateral hip flexion 30lb x  reps   Gurwinder hip extension 30# x 10 reps   Bridging off ball x15  Bilateral piriformis stretch x 5 reps   Supine calms blue theraband x 15  Bilateral figure 4 hip rotation stretch x 5  Bilateral prone hip extension x 10 each  Bilateral hip flexor stretch on step x 5    ROM   Flexion    right hip     119          left  111  Hamstring   right      -18          left   -19         Home Exercises Provided and Patient Education Provided     Education provided: cont home ex program     Written Home Exercises Provided: Patient instructed to cont prior HEP.  Exercises were  reviewed and Maranda was able to demonstrate them prior to the end of the session.  Maranda demonstrated good  understanding of the education provided.     See EMR under Patient Instructions for exercises provided prior visit.    Assessment     Patient progressing gradually with range of motion and glute med. Strengthening.  Maranda Is progressing well towards his goals.   Pt prognosis is Excellent.     Pt will continue to benefit from skilled outpatient physical therapy to address the deficits listed in the problem list box on initial evaluation, provide pt/family education and to maximize pt's level of independence in the home and community environment.     Pt's spiritual, cultural and educational needs considered and pt agreeable to plan of care and goals.     Anticipated Barriers for therapy: decreased hip internal rotation and pain with palpation of left piriformis, greater trochanteric bursitis pain   Goals:  Short Term Goals: 4 weeks   Pt will be independent with home ex program -met  Pt will be able to increase bilateral hip flexion to 120 degrees  Increase hamstring length to -15 degrees  Increase hip internal rotation to 30 degrees left side  Be able to sit longer than 2 hours without piriformis pain .      Long Term Goals: 6 weeks   Pt will be able to sit greater than 4 hours without pain   Pt will be able to work pain free        Plan   Plan of care Certification: 1/20/2023 to 2/19/2022.     Outpatient Physical Therapy 2 times weekly for 6 weeks to include the following interventions: Manual Therapy, Neuromuscular Re-ed, Patient Education, Therapeutic Exercise, Ultrasound, and modalities as needed .      Porsha Pantoja, PTA   2/21/2023

## 2023-02-23 PROBLEM — G89.29 HIP PAIN, CHRONIC, LEFT: Status: RESOLVED | Noted: 2023-01-20 | Resolved: 2023-02-23

## 2023-02-23 PROBLEM — M25.552 HIP PAIN, CHRONIC, LEFT: Status: RESOLVED | Noted: 2023-01-20 | Resolved: 2023-02-23

## 2023-02-23 PROBLEM — M25.652 DECREASED RANGE OF LEFT HIP MOVEMENT: Status: RESOLVED | Noted: 2023-01-27 | Resolved: 2023-02-23

## 2023-02-23 NOTE — PLAN OF CARE
Physical Therapy Treatment Note      Name: Maranda Clark  [facility] Number: [id number]     Therapy Diagnosis:        Encounter Diagnoses   Name Primary?    Hip pain, chronic, left Yes    Decreased range of left hip movement        Physician: Ashley Rodriguez MD     Visit Date: 2/21/2023  Physician Orders: PT Eval and Treat    Medical Diagnosis from Referral: left hip pain   Evaluation Date: 1/20/2023  Updated Plan of Care Due : 2/19/2022  Authorization Period Expiration: blue cross   Plan of Care Expiration: end of the year   Visit # / Visits authorized: 7/ 30  PTA Visit #: 5     Time In: 1232  Time Out: 115  Total Billable Time: 43 minutes     Precautions: Standard     Subjective   .  Pt reports: The MRI just showed arthritis in hip  He was compliant with home exercise program.  Response to previous treatment: muscle soreness  Functional change: bending and walking cause increased pain     Pain: 6/10   Location: left hip anterior to posterior       Objective      Maranda received therapeutic exercises to develop strength, endurance, ROM, and flexibility for 38 minutes including:  Bike x 5 min   Slant board with scap retract and glute sets x 2'   HAMSTRING stretch on step 3 x 30 sec  Pt performed double support leg press 80 lb x 20 reps    Double support calf presses 15 x 85#   Bilateral hip abd 30lb x 12 reps   Bilateral hip add 30lb x  reps    Bilateral hip flexion 30lb x  reps   Gurwinder hip extension 30# x 10 reps   Bridging off ball x15  Bilateral piriformis stretch x 5 reps   Supine calms blue theraband x 15  Bilateral figure 4 hip rotation stretch x 5  Bilateral prone hip extension x 10 each  Bilateral hip flexor stretch on step x 5     ROM   Flexion    right hip     119          left  111  Hamstring   right      -18          left   -19          Home Exercises Provided and Patient Education Provided      Education provided: cont home ex program      Written Home Exercises Provided: Patient instructed to cont prior  HEP.  Exercises were reviewed and Maranda was able to demonstrate them prior to the end of the session.  Maranda demonstrated good  understanding of the education provided.      See EMR under Patient Instructions for exercises provided prior visit.     Assessment      Patient progressing gradually with range of motion and glute med. Strengthening.  Maranda Is progressing well towards his goals.   Pt prognosis is Excellent.      Pt will continue to benefit from skilled outpatient physical therapy to address the deficits listed in the problem list box on initial evaluation, provide pt/family education and to maximize pt's level of independence in the home and community environment.      Pt's spiritual, cultural and educational needs considered and pt agreeable to plan of care and goals.     Anticipated Barriers for therapy: decreased hip internal rotation and pain with palpation of left piriformis, greater trochanteric bursitis pain   Goals:  Short Term Goals: 4 weeks   Pt will be independent with home ex program -met  Pt will be able to increase bilateral hip flexion to 120 degrees  Increase hamstring length to -15 degrees  Increase hip internal rotation to 30 degrees left side  Be able to sit longer than 2 hours without piriformis pain .      Long Term Goals: 6 weeks   Pt will be able to sit greater than 4 hours without pain   Pt will be able to work pain free      Outpatient Therapy Discharge Summary     Name: Maranda Clark  [facility] Number: [id number]    Therapy Diagnosis:   Encounter Diagnoses   Name Primary?    Hip pain, chronic, left Yes    Decreased range of left hip movement      Physician: Ashley Rodriguez MD       Assessment    Goals:  Pt met goals     Discharge reason: Patient has met all of his/her goals    Plan   This patient is discharged from Physical Therapy.       Yannick Barbosa, PT

## 2023-02-24 ENCOUNTER — TELEPHONE (OUTPATIENT)
Dept: FAMILY MEDICINE | Facility: CLINIC | Age: 58
End: 2023-02-24
Payer: COMMERCIAL

## 2023-02-24 NOTE — TELEPHONE ENCOUNTER
Pt did not come in to discuss Pre DM, so I called and discussed that sugar containing beverages and fruit juices make BS rise rapidly and also cause weight gain, best to use the SF variety, avoid juices and drink water as main beverage thru the day.  Pt states eating out of the vending machine at work. I discouraged use of the large Lil Debbies, candy bars, better to make a sandwich at home and bring along with a fruit or small bag of chips.  I gave Pt my number to call, encouraged to keep active and not gain any more weight, can call me for another time to talk.

## 2023-04-10 ENCOUNTER — HOSPITAL ENCOUNTER (OUTPATIENT)
Dept: GASTROENTEROLOGY | Facility: HOSPITAL | Age: 58
Discharge: HOME | End: 2023-04-10
Attending: INTERNAL MEDICINE
Payer: COMMERCIAL

## 2023-04-10 ENCOUNTER — ANESTHESIA (OUTPATIENT)
Dept: GASTROENTEROLOGY | Facility: HOSPITAL | Age: 58
End: 2023-04-10
Payer: COMMERCIAL

## 2023-04-10 ENCOUNTER — ANESTHESIA EVENT (OUTPATIENT)
Dept: GASTROENTEROLOGY | Facility: HOSPITAL | Age: 58
End: 2023-04-10
Payer: COMMERCIAL

## 2023-04-10 VITALS
DIASTOLIC BLOOD PRESSURE: 84 MMHG | RESPIRATION RATE: 13 BRPM | SYSTOLIC BLOOD PRESSURE: 135 MMHG | OXYGEN SATURATION: 100 % | TEMPERATURE: 98 F | HEART RATE: 58 BPM

## 2023-04-10 DIAGNOSIS — K63.5 POLYP OF SIGMOID COLON, UNSPECIFIED TYPE: ICD-10-CM

## 2023-04-10 DIAGNOSIS — Z86.010 HISTORY OF COLON POLYPS: Primary | ICD-10-CM

## 2023-04-10 DIAGNOSIS — K63.5 POLYP OF TRANSVERSE COLON, UNSPECIFIED TYPE: ICD-10-CM

## 2023-04-10 DIAGNOSIS — K63.5 POLYP OF ASCENDING COLON, UNSPECIFIED TYPE: ICD-10-CM

## 2023-04-10 DIAGNOSIS — Z12.11 COLON CANCER SCREENING: ICD-10-CM

## 2023-04-10 PROBLEM — Z86.0100 HISTORY OF COLON POLYPS: Status: ACTIVE | Noted: 2023-04-10

## 2023-04-10 PROCEDURE — D9220A PRA ANESTHESIA: Mod: 33,,, | Performed by: NURSE ANESTHETIST, CERTIFIED REGISTERED

## 2023-04-10 PROCEDURE — 88305 SURGICAL PATHOLOGY: ICD-10-PCS | Mod: 26,,, | Performed by: PATHOLOGY

## 2023-04-10 PROCEDURE — 45380 PR COLONOSCOPY,BIOPSY: ICD-10-PCS | Mod: 33,59,, | Performed by: INTERNAL MEDICINE

## 2023-04-10 PROCEDURE — 45385 COLONOSCOPY W/LESION REMOVAL: CPT | Mod: 33,,, | Performed by: INTERNAL MEDICINE

## 2023-04-10 PROCEDURE — D9220A PRA ANESTHESIA: ICD-10-PCS | Mod: 33,,, | Performed by: NURSE ANESTHETIST, CERTIFIED REGISTERED

## 2023-04-10 PROCEDURE — 88305 TISSUE EXAM BY PATHOLOGIST: CPT | Mod: TC,SUR | Performed by: INTERNAL MEDICINE

## 2023-04-10 PROCEDURE — 88305 TISSUE EXAM BY PATHOLOGIST: CPT | Mod: 26,,, | Performed by: PATHOLOGY

## 2023-04-10 PROCEDURE — 45380 COLONOSCOPY AND BIOPSY: CPT | Mod: PT,59 | Performed by: INTERNAL MEDICINE

## 2023-04-10 PROCEDURE — 45380 COLONOSCOPY AND BIOPSY: CPT | Mod: 33,59,, | Performed by: INTERNAL MEDICINE

## 2023-04-10 PROCEDURE — 37000009 HC ANESTHESIA EA ADD 15 MINS

## 2023-04-10 PROCEDURE — 45385 PR COLONOSCOPY,REMV LESN,SNARE: ICD-10-PCS | Mod: 33,,, | Performed by: INTERNAL MEDICINE

## 2023-04-10 PROCEDURE — 27201423 OPTIME MED/SURG SUP & DEVICES STERILE SUPPLY

## 2023-04-10 PROCEDURE — 37000008 HC ANESTHESIA 1ST 15 MINUTES

## 2023-04-10 PROCEDURE — 45385 COLONOSCOPY W/LESION REMOVAL: CPT | Mod: PT | Performed by: INTERNAL MEDICINE

## 2023-04-10 PROCEDURE — 25000003 PHARM REV CODE 250: Performed by: NURSE ANESTHETIST, CERTIFIED REGISTERED

## 2023-04-10 PROCEDURE — 63600175 PHARM REV CODE 636 W HCPCS: Performed by: NURSE ANESTHETIST, CERTIFIED REGISTERED

## 2023-04-10 RX ORDER — SODIUM CHLORIDE 0.9 % (FLUSH) 0.9 %
10 SYRINGE (ML) INJECTION
Status: DISCONTINUED | OUTPATIENT
Start: 2023-04-10 | End: 2023-04-11 | Stop reason: HOSPADM

## 2023-04-10 RX ORDER — PROPOFOL 10 MG/ML
VIAL (ML) INTRAVENOUS
Status: DISCONTINUED | OUTPATIENT
Start: 2023-04-10 | End: 2023-04-10

## 2023-04-10 RX ORDER — LIDOCAINE HYDROCHLORIDE 20 MG/ML
INJECTION, SOLUTION EPIDURAL; INFILTRATION; INTRACAUDAL; PERINEURAL
Status: DISCONTINUED | OUTPATIENT
Start: 2023-04-10 | End: 2023-04-10

## 2023-04-10 RX ADMIN — PROPOFOL 20 MG: 10 INJECTION, EMULSION INTRAVENOUS at 11:04

## 2023-04-10 RX ADMIN — PROPOFOL 50 MG: 10 INJECTION, EMULSION INTRAVENOUS at 11:04

## 2023-04-10 RX ADMIN — SODIUM CHLORIDE: 9 INJECTION, SOLUTION INTRAVENOUS at 11:04

## 2023-04-10 RX ADMIN — PROPOFOL 100 MG: 10 INJECTION, EMULSION INTRAVENOUS at 11:04

## 2023-04-10 RX ADMIN — LIDOCAINE HYDROCHLORIDE 60 MG: 20 INJECTION, SOLUTION INTRAVENOUS at 11:04

## 2023-04-10 NOTE — H&P
[facility]  Gastroenterology  H&P    Patient Name: Maranda Clark  MRN: [id number]  Admission Date: 4/10/2023  Code Status: Full Code    Attending Provider: Rudy Sampson MD   Primary Care Physician: Ashley Rodriguez MD  Principal Problem:<principal problem not specified>    Subjective:     History of Present Illness: Pt has history of colon polyps . His last colonoscopy was around 2016.    History reviewed. No pertinent past medical history.    History reviewed. No pertinent surgical history.    Review of patient's allergies indicates:  No Known Allergies  Family History    None       Tobacco Use    Smoking status: Never    Smokeless tobacco: Never   Substance and Sexual Activity    Alcohol use: Yes    Drug use: Never    Sexual activity: Not on file     Review of Systems   Respiratory: Negative.     Cardiovascular: Negative.    Gastrointestinal: Negative.    Objective:     Vital Signs (Most Recent):  Pulse: (!) 53 (04/10/23 1005)  Resp: (!) 22 (04/10/23 1005)  BP: (!) 144/86 (04/10/23 1005)  SpO2: 100 % (04/10/23 1005)   Vital Signs (24h Range):  Pulse:  [53] 53  Resp:  [22] 22  SpO2:  [100 %] 100 %  BP: (144)/(86) 144/86        There is no height or weight on file to calculate BMI.    No intake or output data in the 24 hours ending 04/10/23 1108    Lines/Drains/Airways       Peripheral Intravenous Line  Duration                  Peripheral IV - Single Lumen 04/10/23 1005 22 G Anterior;Right Hand <1 day                    Physical Exam  Vitals reviewed.   Constitutional:       General: He is not in acute distress.     Appearance: Normal appearance. He is well-developed. He is not ill-appearing.   HENT:      Head: Normocephalic and atraumatic.      Nose: Nose normal.   Eyes:      Pupils: Pupils are equal, round, and reactive to light.   Cardiovascular:      Rate and Rhythm: Normal rate and regular rhythm.   Pulmonary:      Effort: Pulmonary effort is normal.      Breath sounds: Normal breath sounds. No  wheezing.   Abdominal:      General: Abdomen is flat. Bowel sounds are normal. There is no distension.      Palpations: Abdomen is soft.      Tenderness: There is no abdominal tenderness. There is no guarding.   Skin:     General: Skin is warm and dry.      Coloration: Skin is not jaundiced.   Neurological:      Mental Status: He is alert.   Psychiatric:         Attention and Perception: Attention normal.         Mood and Affect: Affect normal.         Speech: Speech normal.         Behavior: Behavior is cooperative.      Comments: Pt was calm while speaking.       Significant Labs:  CBC: No results for input(s): WBC, HGB, HCT, PLT in the last 48 hours.  CMP: No results for input(s): GLU, CALCIUM, ALBUMIN, PROT, NA, K, CO2, CL, BUN, CREATININE, ALKPHOS, ALT, AST, BILITOT in the last 48 hours.    Significant Imaging:  Imaging results within the past 24 hours have been reviewed.    Assessment/Plan:     There are no hospital problems to display for this patient.        Imp: history of colon polyps  Plan: colonoscopy    Rudy Sampson MD  Gastroenterology  [facility]

## 2023-04-10 NOTE — ANESTHESIA POSTPROCEDURE EVALUATION
Anesthesia Post Evaluation    Patient: Maranda Clark    Procedure(s) Performed: * No procedures listed *    Final Anesthesia Type: general      Patient location during evaluation: GI PACU  Patient participation: Yes- Able to Participate  Level of consciousness: awake and alert  Post-procedure vital signs: reviewed and stable  Pain management: adequate  Airway patency: patent    PONV status at discharge: No PONV  Anesthetic complications: no      Cardiovascular status: blood pressure returned to baseline and hemodynamically stable  Respiratory status: spontaneous ventilation  Hydration status: euvolemic  Follow-up not needed.  Comments: Pt voices appreciation for care          Vitals Value Taken Time   /73 04/10/23 1132   Temp 36.7 °C (98.1 °F) 04/10/23 1126   Pulse 74 04/10/23 1135   Resp 16 04/10/23 1135   SpO2 100 % 04/10/23 1135   Vitals shown include unvalidated device data.      No case tracking events are documented in the log.      Pain/Nicole Score: Nicole Score: 8 (4/10/2023 11:28 AM)

## 2023-04-10 NOTE — ANESTHESIA PREPROCEDURE EVALUATION
04/10/2023  Maranda Clark is a 57 y.o., male.      Pre-op Assessment    I have reviewed the Patient Summary Reports.     I have reviewed the Nursing Notes. I have reviewed the NPO Status.   I have reviewed the Medications.     Review of Systems  Anesthesia Hx:  No problems with previous Anesthesia    Social:  Non-Smoker    Cardiovascular:   Hypertension        Physical Exam  General: Well nourished        Anesthesia Plan  Type of Anesthesia, risks & benefits discussed:    Anesthesia Type: Gen Natural Airway  Intra-op Monitoring Plan: Standard ASA Monitors  Post Op Pain Control Plan: IV/PO Opioids PRN  Induction:  IV  Informed Consent: Informed consent signed with the Patient and all parties understand the risks and agree with anesthesia plan.  All questions answered. Patient consented to blood products? Yes  ASA Score: 2  Day of Surgery Review of History & Physical: H&P Update referred to the surgeon/provider.I have interviewed and examined the patient. I have reviewed the patient's H&P dated: There are no significant changes.     Ready For Surgery From Anesthesia Perspective.     .

## 2023-04-10 NOTE — DISCHARGE INSTRUCTIONS
Procedure Date  4/10/23     Impression  Overall Impression: Diverticula were present in the sigmoid and descending colon. Three polyps were removed with biopsy, from the ascending, transverse and sigmoid colon. One semi-pedunculated polyp was removed from the sigmoid colon with hot snare.     Recommendation    Await pathology results     Repeat colonoscopy in 5 years      High fiber diet  Discharge  Disp: DC to home in stable condition. Resume diet. No driving x 24 hours. F/U with PCP as scheduled.  Dx: History of colon polyps; colon diverticulosis, four polyps were removed on this exam.     THE NURSE WILL CALL YOU WITH YOUR BIOPSY RESULTS IN A FEW DAYS.   NO DRIVING, OPERATING EQUIPMENT, OR SIGNING LEGAL DOCUMENTS FOR 24 HOURS.

## 2023-04-10 NOTE — TRANSFER OF CARE
Anesthesia Transfer of Care Note    Patient: Maranda Clark    Procedure(s) Performed: * No procedures listed *    Patient location: GI    Anesthesia Type: general    Transport from OR: Transported from OR on room air with adequate spontaneous ventilation. Continuous ECG monitoring in transport. Continuous SpO2 monitoring in transport    Post pain: adequate analgesia    Post assessment: no apparent anesthetic complications    Post vital signs: stable    Level of consciousness: sedated and responds to stimulation    Nausea/Vomiting: no nausea/vomiting    Complications: none    Transfer of care protocol was followedComments: Good SV continue, NAD, VSS, RTRN      Last vitals:   Visit Vitals  /67   Pulse 72   Temp 36.7 °C (98.1 °F)   Resp 18   SpO2 98%

## 2023-04-11 LAB
ESTROGEN SERPL-MCNC: NORMAL PG/ML
INSULIN SERPL-ACNC: NORMAL U[IU]/ML
LAB AP GROSS DESCRIPTION: NORMAL
LAB AP LABORATORY NOTES: NORMAL
T3RU NFR SERPL: NORMAL %

## 2023-04-11 NOTE — PROGRESS NOTES
Place pt on list for colonoscopy in 3 yrs. Polyps were tubular adenoma in sigmoid colon and serrated adenomas in ascending and transverse colon.

## 2023-04-24 ENCOUNTER — TELEPHONE (OUTPATIENT)
Dept: GASTROENTEROLOGY | Facility: CLINIC | Age: 58
End: 2023-04-24
Payer: COMMERCIAL

## 2023-04-24 NOTE — TELEPHONE ENCOUNTER
Attempted to call patient about results and left vm for a call back.        ----- Message from Rudy Sampson MD sent at 4/11/2023  4:38 PM CDT -----  Place pt on list for colonoscopy in 3 yrs. Polyps were tubular adenoma in sigmoid colon and serrated adenomas in ascending and transverse colon.

## 2023-04-26 ENCOUNTER — TELEPHONE (OUTPATIENT)
Dept: GASTROENTEROLOGY | Facility: CLINIC | Age: 58
End: 2023-04-26
Payer: COMMERCIAL

## 2023-04-26 NOTE — TELEPHONE ENCOUNTER
Called patient to discuss results and verbalized understanding.      ----- Message from Rudy Sampson MD sent at 4/11/2023  4:38 PM CDT -----  Place pt on list for colonoscopy in 3 yrs. Polyps were tubular adenoma in sigmoid colon and serrated adenomas in ascending and transverse colon.

## 2023-05-22 ENCOUNTER — OFFICE VISIT (OUTPATIENT)
Dept: FAMILY MEDICINE | Facility: CLINIC | Age: 58
End: 2023-05-22
Payer: COMMERCIAL

## 2023-05-22 VITALS
SYSTOLIC BLOOD PRESSURE: 130 MMHG | OXYGEN SATURATION: 100 % | TEMPERATURE: 99 F | HEIGHT: 72 IN | RESPIRATION RATE: 18 BRPM | HEART RATE: 51 BPM | WEIGHT: 225 LBS | DIASTOLIC BLOOD PRESSURE: 78 MMHG | BODY MASS INDEX: 30.48 KG/M2

## 2023-05-22 DIAGNOSIS — L30.9 DERMATITIS: ICD-10-CM

## 2023-05-22 DIAGNOSIS — I10 HYPERTENSION, UNSPECIFIED TYPE: ICD-10-CM

## 2023-05-22 DIAGNOSIS — M19.90 ARTHRITIS: ICD-10-CM

## 2023-05-22 DIAGNOSIS — B37.2 CUTANEOUS CANDIDIASIS: ICD-10-CM

## 2023-05-22 DIAGNOSIS — I10 PRIMARY HYPERTENSION: Primary | ICD-10-CM

## 2023-05-22 LAB
ALBUMIN SERPL BCP-MCNC: 3.8 G/DL (ref 3.5–5)
ALBUMIN/GLOB SERPL: 0.9 {RATIO}
ALP SERPL-CCNC: 70 U/L (ref 45–115)
ALT SERPL W P-5'-P-CCNC: 32 U/L (ref 16–61)
ANION GAP SERPL CALCULATED.3IONS-SCNC: 8 MMOL/L (ref 7–16)
AST SERPL W P-5'-P-CCNC: 30 U/L (ref 15–37)
BASOPHILS # BLD AUTO: 0.09 K/UL (ref 0–0.2)
BASOPHILS NFR BLD AUTO: 1.1 % (ref 0–1)
BILIRUB SERPL-MCNC: 0.9 MG/DL (ref ?–1.2)
BUN SERPL-MCNC: 14 MG/DL (ref 7–18)
BUN/CREAT SERPL: 13 (ref 6–20)
CALCIUM SERPL-MCNC: 10 MG/DL (ref 8.5–10.1)
CHLORIDE SERPL-SCNC: 108 MMOL/L (ref 98–107)
CO2 SERPL-SCNC: 27 MMOL/L (ref 21–32)
CREAT SERPL-MCNC: 1.1 MG/DL (ref 0.7–1.3)
DIFFERENTIAL METHOD BLD: ABNORMAL
EGFR (NO RACE VARIABLE) (RUSH/TITUS): 78 ML/MIN/1.73M2
EOSINOPHIL # BLD AUTO: 0.54 K/UL (ref 0–0.5)
EOSINOPHIL NFR BLD AUTO: 6.6 % (ref 1–4)
ERYTHROCYTE [DISTWIDTH] IN BLOOD BY AUTOMATED COUNT: 13.9 % (ref 11.5–14.5)
GLOBULIN SER-MCNC: 4.1 G/DL (ref 2–4)
GLUCOSE SERPL-MCNC: 85 MG/DL (ref 74–106)
HCT VFR BLD AUTO: 46.6 % (ref 40–54)
HGB BLD-MCNC: 15.3 G/DL (ref 13.5–18)
IMM GRANULOCYTES # BLD AUTO: 0.02 K/UL (ref 0–0.04)
IMM GRANULOCYTES NFR BLD: 0.2 % (ref 0–0.4)
LYMPHOCYTES # BLD AUTO: 2.63 K/UL (ref 1–4.8)
LYMPHOCYTES NFR BLD AUTO: 32 % (ref 27–41)
MCH RBC QN AUTO: 30 PG (ref 27–31)
MCHC RBC AUTO-ENTMCNC: 32.8 G/DL (ref 32–36)
MCV RBC AUTO: 91.4 FL (ref 80–96)
MONOCYTES # BLD AUTO: 0.97 K/UL (ref 0–0.8)
MONOCYTES NFR BLD AUTO: 11.8 % (ref 2–6)
MPC BLD CALC-MCNC: 12.4 FL (ref 9.4–12.4)
NEUTROPHILS # BLD AUTO: 3.96 K/UL (ref 1.8–7.7)
NEUTROPHILS NFR BLD AUTO: 48.3 % (ref 53–65)
NRBC # BLD AUTO: 0 X10E3/UL
NRBC, AUTO (.00): 0 %
PLATELET # BLD AUTO: 203 K/UL (ref 150–400)
POTASSIUM SERPL-SCNC: 3.8 MMOL/L (ref 3.5–5.1)
PROT SERPL-MCNC: 7.9 G/DL (ref 6.4–8.2)
RBC # BLD AUTO: 5.1 M/UL (ref 4.6–6.2)
SODIUM SERPL-SCNC: 139 MMOL/L (ref 136–145)
WBC # BLD AUTO: 8.21 K/UL (ref 4.5–11)

## 2023-05-22 PROCEDURE — 99213 OFFICE O/P EST LOW 20 MIN: CPT | Mod: ,,, | Performed by: NURSE PRACTITIONER

## 2023-05-22 PROCEDURE — 85025 COMPLETE CBC W/AUTO DIFF WBC: CPT | Mod: ,,, | Performed by: CLINICAL MEDICAL LABORATORY

## 2023-05-22 PROCEDURE — 3075F SYST BP GE 130 - 139MM HG: CPT | Mod: ,,, | Performed by: NURSE PRACTITIONER

## 2023-05-22 PROCEDURE — 85025 CBC WITH DIFFERENTIAL: ICD-10-PCS | Mod: ,,, | Performed by: CLINICAL MEDICAL LABORATORY

## 2023-05-22 PROCEDURE — 99213 PR OFFICE/OUTPT VISIT, EST, LEVL III, 20-29 MIN: ICD-10-PCS | Mod: ,,, | Performed by: NURSE PRACTITIONER

## 2023-05-22 PROCEDURE — 3044F HG A1C LEVEL LT 7.0%: CPT | Mod: ,,, | Performed by: NURSE PRACTITIONER

## 2023-05-22 PROCEDURE — 3044F PR MOST RECENT HEMOGLOBIN A1C LEVEL <7.0%: ICD-10-PCS | Mod: ,,, | Performed by: NURSE PRACTITIONER

## 2023-05-22 PROCEDURE — 3075F PR MOST RECENT SYSTOLIC BLOOD PRESS GE 130-139MM HG: ICD-10-PCS | Mod: ,,, | Performed by: NURSE PRACTITIONER

## 2023-05-22 PROCEDURE — 3078F DIAST BP <80 MM HG: CPT | Mod: ,,, | Performed by: NURSE PRACTITIONER

## 2023-05-22 PROCEDURE — 1160F PR REVIEW ALL MEDS BY PRESCRIBER/CLIN PHARMACIST DOCUMENTED: ICD-10-PCS | Mod: ,,, | Performed by: NURSE PRACTITIONER

## 2023-05-22 PROCEDURE — 1160F RVW MEDS BY RX/DR IN RCRD: CPT | Mod: ,,, | Performed by: NURSE PRACTITIONER

## 2023-05-22 PROCEDURE — 80053 COMPREHEN METABOLIC PANEL: CPT | Mod: ,,, | Performed by: CLINICAL MEDICAL LABORATORY

## 2023-05-22 PROCEDURE — 80053 COMPREHENSIVE METABOLIC PANEL: ICD-10-PCS | Mod: ,,, | Performed by: CLINICAL MEDICAL LABORATORY

## 2023-05-22 PROCEDURE — 1159F PR MEDICATION LIST DOCUMENTED IN MEDICAL RECORD: ICD-10-PCS | Mod: ,,, | Performed by: NURSE PRACTITIONER

## 2023-05-22 PROCEDURE — 3008F PR BODY MASS INDEX (BMI) DOCUMENTED: ICD-10-PCS | Mod: ,,, | Performed by: NURSE PRACTITIONER

## 2023-05-22 PROCEDURE — 1159F MED LIST DOCD IN RCRD: CPT | Mod: ,,, | Performed by: NURSE PRACTITIONER

## 2023-05-22 PROCEDURE — 3008F BODY MASS INDEX DOCD: CPT | Mod: ,,, | Performed by: NURSE PRACTITIONER

## 2023-05-22 PROCEDURE — 3078F PR MOST RECENT DIASTOLIC BLOOD PRESSURE < 80 MM HG: ICD-10-PCS | Mod: ,,, | Performed by: NURSE PRACTITIONER

## 2023-05-22 RX ORDER — NYSTATIN 100000 U/G
CREAM TOPICAL 2 TIMES DAILY
Qty: 30 G | Refills: 2 | Status: SHIPPED | OUTPATIENT
Start: 2023-05-22 | End: 2023-10-06 | Stop reason: SDUPTHER

## 2023-05-22 RX ORDER — TRIAMCINOLONE ACETONIDE 0.25 MG/G
CREAM TOPICAL 2 TIMES DAILY
Qty: 80 G | Refills: 0 | Status: SHIPPED | OUTPATIENT
Start: 2023-05-22 | End: 2023-10-06 | Stop reason: SDUPTHER

## 2023-05-22 NOTE — PROGRESS NOTES
2023     SANJEEV Hall   [facility]  [address]  [place] [address]     PATIENT NAME: Maranda Clark  : 1965  DATE: 23  MRN: [id number]      Billing Provider: SANJEEV Hall  Level of Service:   Patient PCP Information       Provider PCP Type    Ashley Rodriguez MD General            Reason for Visit / Chief Complaint: Hypertension and Health Maintenance (There are no preventive care reminders to display for this patient./) There are no preventive care reminders to display for this patient.       Update PCP  Update Chief Complaint         History of Present Illness / Problem Focused Workflow     Maranda Clark presents to the clinic follow up hypertension - reports BP  a little better with his checks but not to goal. Discussed diet and activity does consume soft drinks and not exercising, discussed lifestyle modifications. He also reports a rash in skin folds that occurs every summer- discussed yeast will send nystatin    Hemoglobin A1C   Date Value Ref Range Status   2023 5.8 4.5 - 6.6 % Final     Comment:       Normal:               <5.7%  Pre-Diabetic:       5.7% to 6.4%  Diabetic:             >6.4%  Diabetic Goal:     <7%        CMP  Sodium   Date Value Ref Range Status   2023 140 136 - 145 mmol/L Final     Potassium   Date Value Ref Range Status   2023 4.0 3.5 - 5.1 mmol/L Final     Chloride   Date Value Ref Range Status   2023 107 98 - 107 mmol/L Final     CO2   Date Value Ref Range Status   2023 30 21 - 32 mmol/L Final     Glucose   Date Value Ref Range Status   2023 95 74 - 106 mg/dL Final     BUN   Date Value Ref Range Status   2023 9 7 - 18 mg/dL Final     Creatinine   Date Value Ref Range Status   2023 1.17 0.70 - 1.30 mg/dL Final     Calcium   Date Value Ref Range Status   2023 10.2 (H) 8.5 - 10.1 mg/dL Final     Total Protein   Date Value Ref Range Status   2023 8.1 6.4 - 8.2 g/dL  Final     Albumin   Date Value Ref Range Status   01/09/2023 3.9 3.5 - 5.0 g/dL Final     Bilirubin, Total   Date Value Ref Range Status   01/09/2023 0.5 >0.0 - 1.2 mg/dL Final     Alk Phos   Date Value Ref Range Status   01/09/2023 73 45 - 115 U/L Final     AST   Date Value Ref Range Status   01/09/2023 18 15 - 37 U/L Final     ALT   Date Value Ref Range Status   01/09/2023 26 16 - 61 U/L Final     Anion Gap   Date Value Ref Range Status   01/09/2023 7 7 - 16 mmol/L Final     eGFR   Date Value Ref Range Status   01/09/2023 73 >=60 mL/min/1.73m² Final        No results found for: WBC, RBC, HGB, HCT, MCV, MCH, MCHC, RDW, PLT, MPV, GRAN, LYMPH, MONO, EOS, BASO, EOSINOPHIL, BASOPHIL     Lab Results   Component Value Date    CHOL 152 02/13/2023    CHOL 186 01/09/2023    CHOL 208 (H) 06/27/2022     Lab Results   Component Value Date    HDL 54 02/13/2023    HDL 51 01/09/2023    HDL 51 06/27/2022     Lab Results   Component Value Date    LDLCALC 88 02/13/2023    LDLCALC 124 01/09/2023    LDLCALC 145 06/27/2022     Lab Results   Component Value Date    TRIG 52 02/13/2023    TRIG 56 01/09/2023    TRIG 61 06/27/2022     Lab Results   Component Value Date    CHOLHDL 2.8 02/13/2023    CHOLHDL 3.6 01/09/2023    CHOLHDL 4.1 06/27/2022        Wt Readings from Last 3 Encounters:   05/22/23 0943 102.1 kg (225 lb)   02/13/23 1325 103.9 kg (229 lb)   01/09/23 1605 101.6 kg (224 lb)        BP Readings from Last 3 Encounters:   05/22/23 130/78   04/10/23 135/84   02/13/23 (!) 153/93        Review of Systems     Review of Systems   Constitutional:  Negative for activity change, chills, fatigue and fever.   HENT:  Negative for nasal congestion, ear pain, sore throat and trouble swallowing.    Eyes:  Negative for visual disturbance.   Respiratory:  Negative for cough, chest tightness, shortness of breath and wheezing.    Cardiovascular:  Negative for chest pain, palpitations, leg swelling and claudication.   Gastrointestinal:  Negative for  abdominal pain, change in bowel habit and change in bowel habit.   Genitourinary:  Negative for decreased urine volume.   Musculoskeletal:  Positive for arthralgias. Negative for back pain.   Integumentary:  Positive for rash.   Neurological:  Negative for weakness and headaches.      Medical / Social / Family History     Past Medical History:   Diagnosis Date    Arthritis     Hypertension        History reviewed. No pertinent surgical history.    Social History    reports that he has never smoked. He has never been exposed to tobacco smoke. He has never used smokeless tobacco. He reports current alcohol use. He reports that he does not use drugs.    Family History  's family history includes Diabetes in his sister; Hypertension in his sister.    Medications and Allergies     Medications  Outpatient Medications Marked as Taking for the 5/22/23 encounter (Office Visit) with SANJEEV Hall   Medication Sig Dispense Refill    amLODIPine (NORVASC) 10 MG tablet Take 1 tablet (10 mg total) by mouth every evening. 90 tablet 1    meloxicam (MOBIC) 15 MG tablet Take 1 tablet (15 mg total) by mouth once daily. 90 tablet 1    metoprolol succinate (TOPROL-XL) 200 MG 24 hr tablet Take 2 tablets (400 mg total) by mouth once daily. 180 tablet 1    [DISCONTINUED] triamcinolone acetonide 0.025% (KENALOG) 0.025 % cream Apply topically 2 (two) times daily. 80 g 0       Allergies  Review of patient's allergies indicates:  No Known Allergies    Physical Examination     Vitals:    05/22/23 0943 05/22/23 1022   BP: (!) 142/91 130/78   BP Location: Right arm Left arm   Patient Position: Sitting Sitting   Pulse: (!) 51    Resp: 18    Temp: 98.7 °F (37.1 °C)    TempSrc: Oral    SpO2: 100%    Weight: 102.1 kg (225 lb)    Height: 6' (1.829 m)       Physical Exam  Vitals and nursing note reviewed.   Constitutional:       General: He is not in acute distress.     Appearance: Normal appearance. He is not ill-appearing,  toxic-appearing or diaphoretic.   HENT:      Head: Normocephalic.      Right Ear: Tympanic membrane, ear canal and external ear normal.      Left Ear: Tympanic membrane, ear canal and external ear normal.      Nose: Nose normal.      Mouth/Throat:      Mouth: Mucous membranes are moist.      Pharynx: Oropharynx is clear.   Eyes:      General: No scleral icterus.     Conjunctiva/sclera: Conjunctivae normal.      Pupils: Pupils are equal, round, and reactive to light.   Cardiovascular:      Rate and Rhythm: Normal rate and regular rhythm.      Heart sounds: Normal heart sounds.   Pulmonary:      Effort: Pulmonary effort is normal.      Breath sounds: Normal breath sounds. No wheezing, rhonchi or rales.   Musculoskeletal:         General: Normal range of motion.      Cervical back: Normal range of motion and neck supple. No rigidity or tenderness.   Lymphadenopathy:      Cervical: No cervical adenopathy.   Skin:     Capillary Refill: Capillary refill takes less than 2 seconds.   Neurological:      Mental Status: He is alert and oriented to person, place, and time.        Assessment and Plan (including Health Maintenance)      Problem List  Smart Sets  Document Outside HM   :    Plan:     Patient Instructions     Therapeutic Lifestyle Changes   LIFESTYLE CHANGE RECOMMENDATION APPROXIMATE REDUCTION IN SBP   Weight reduction Maintain a normal body weight                      (BMI 19-25) 5-20 mm Hg per 10 kg lost   Dash diet Consume a diet rich in fruits, vegetables, and low-fat dairy products with a reduced content of saturated fat and total fat 8-14 mg Hg   Low-sodium diet Consume <2400 mg of sodium per day 2-8 mg Hg   Increase physical activity Regular aerobic physical activity (i.e. brisk walking at least 40 minutes/session 3-4 days a week) 4-9 mm Hg   Limit alcohol consumption Less than 2 drinks/day in most men or less than 1 drink/day in women and lighter weight persons (1 drink = 12 oz. Beer, 5 oz. Wine, 1.5 oz.  hard alcohol) 2-4 mm Hg   Smoking cessation Quit Smoking (Not reported) - known to reduce risk of developing cardiovascular disease.       There are no preventive care reminders to display for this patient.     There are no preventive care reminders to display for this patient.    Problem List Items Addressed This Visit          Cardiac/Vascular    Hypertension - Primary    Current Assessment & Plan     Discussed not to goal follow low sodium diet increase physical activity and monitor goal 130/80 or less           Relevant Orders    Comprehensive Metabolic Panel    CBC Auto Differential       Orthopedic    Arthritis     Other Visit Diagnoses       Dermatitis        Relevant Medications    triamcinolone acetonide 0.025% (KENALOG) 0.025 % cream    Cutaneous candidiasis        Relevant Medications    nystatin (MYCOSTATIN) cream          Primary hypertension  -     Comprehensive Metabolic Panel; Future; Expected date: 05/22/2023  -     CBC Auto Differential; Future; Expected date: 05/22/2023    Dermatitis  -     triamcinolone acetonide 0.025% (KENALOG) 0.025 % cream; Apply topically 2 (two) times daily. Apply topically to back and waist area  Dispense: 80 g; Refill: 0    Hypertension, unspecified type    Arthritis    Cutaneous candidiasis  -     nystatin (MYCOSTATIN) cream; Apply topically 2 (two) times daily.  Dispense: 30 g; Refill: 2       Health Maintenance Topics with due status: Not Due       Topic Last Completion Date    TETANUS VACCINE 01/27/2022    Hemoglobin A1c (Prediabetes) 01/09/2023    Lipid Panel 02/13/2023    Colorectal Cancer Screening 04/11/2023       Procedures There are no preventive care reminders to display for this patient.     Future Appointments   Date Time Provider Department Center   8/22/2023  2:15 PM SANJEEV Hall [facility]   11/22/2023  8:00 AM SANJEEV Hall [facility]   2/14/2024  1:45 PM SANJEEV Hall  [facility]        Follow up in about 3 months (around 8/22/2023).  There are no preventive care reminders to display for this patient.     Signature:  SANJEEV Hall    Date of encounter: 5/22/23

## 2023-05-22 NOTE — PATIENT INSTRUCTIONS
Therapeutic Lifestyle Changes   LIFESTYLE CHANGE RECOMMENDATION APPROXIMATE REDUCTION IN SBP   Weight reduction Maintain a normal body weight                      (BMI 19-25) 5-20 mm Hg per 10 kg lost   Dash diet Consume a diet rich in fruits, vegetables, and low-fat dairy products with a reduced content of saturated fat and total fat 8-14 mg Hg   Low-sodium diet Consume <2400 mg of sodium per day 2-8 mg Hg   Increase physical activity Regular aerobic physical activity (i.e. brisk walking at least 40 minutes/session 3-4 days a week) 4-9 mm Hg   Limit alcohol consumption Less than 2 drinks/day in most men or less than 1 drink/day in women and lighter weight persons (1 drink = 12 oz. Beer, 5 oz. Wine, 1.5 oz. hard alcohol) 2-4 mm Hg   Smoking cessation Quit Smoking (Not reported) - known to reduce risk of developing cardiovascular disease.

## 2023-05-22 NOTE — ASSESSMENT & PLAN NOTE
Discussed not to goal follow low sodium diet increase physical activity and monitor goal 130/80 or less  
Yes

## 2023-09-29 DIAGNOSIS — I10 PRIMARY HYPERTENSION: ICD-10-CM

## 2023-09-29 RX ORDER — AMLODIPINE BESYLATE 10 MG/1
10 TABLET ORAL NIGHTLY
Qty: 30 TABLET | Refills: 2 | Status: SHIPPED | OUTPATIENT
Start: 2023-09-29 | End: 2023-10-06 | Stop reason: SDUPTHER

## 2023-09-29 RX ORDER — METOPROLOL SUCCINATE 200 MG/1
400 TABLET, EXTENDED RELEASE ORAL DAILY
Qty: 60 TABLET | Refills: 2 | Status: SHIPPED | OUTPATIENT
Start: 2023-09-29 | End: 2023-10-06 | Stop reason: SDUPTHER

## 2023-09-29 RX ORDER — MELOXICAM 15 MG/1
15 TABLET ORAL DAILY
COMMUNITY
Start: 2023-08-24 | End: 2023-10-06 | Stop reason: SDUPTHER

## 2023-10-06 ENCOUNTER — OFFICE VISIT (OUTPATIENT)
Dept: FAMILY MEDICINE | Facility: CLINIC | Age: 58
End: 2023-10-06
Payer: COMMERCIAL

## 2023-10-06 VITALS
OXYGEN SATURATION: 98 % | RESPIRATION RATE: 20 BRPM | SYSTOLIC BLOOD PRESSURE: 126 MMHG | BODY MASS INDEX: 29.26 KG/M2 | HEIGHT: 72 IN | WEIGHT: 216 LBS | HEART RATE: 68 BPM | DIASTOLIC BLOOD PRESSURE: 80 MMHG | TEMPERATURE: 98 F

## 2023-10-06 DIAGNOSIS — K27.9 PUD (PEPTIC ULCER DISEASE): Primary | ICD-10-CM

## 2023-10-06 DIAGNOSIS — E78.2 MIXED HYPERLIPIDEMIA: ICD-10-CM

## 2023-10-06 DIAGNOSIS — B37.2 CUTANEOUS CANDIDIASIS: ICD-10-CM

## 2023-10-06 DIAGNOSIS — Z86.010 HISTORY OF COLON POLYPS: ICD-10-CM

## 2023-10-06 DIAGNOSIS — L30.9 DERMATITIS: ICD-10-CM

## 2023-10-06 DIAGNOSIS — M19.90 ARTHRITIS: ICD-10-CM

## 2023-10-06 DIAGNOSIS — I10 PRIMARY HYPERTENSION: ICD-10-CM

## 2023-10-06 PROCEDURE — 99215 OFFICE O/P EST HI 40 MIN: CPT | Mod: ,,, | Performed by: FAMILY MEDICINE

## 2023-10-06 PROCEDURE — 1159F PR MEDICATION LIST DOCUMENTED IN MEDICAL RECORD: ICD-10-PCS | Mod: ,,, | Performed by: FAMILY MEDICINE

## 2023-10-06 PROCEDURE — 3079F PR MOST RECENT DIASTOLIC BLOOD PRESSURE 80-89 MM HG: ICD-10-PCS | Mod: ,,, | Performed by: FAMILY MEDICINE

## 2023-10-06 PROCEDURE — 3044F HG A1C LEVEL LT 7.0%: CPT | Mod: ,,, | Performed by: FAMILY MEDICINE

## 2023-10-06 PROCEDURE — 1159F MED LIST DOCD IN RCRD: CPT | Mod: ,,, | Performed by: FAMILY MEDICINE

## 2023-10-06 PROCEDURE — 3074F PR MOST RECENT SYSTOLIC BLOOD PRESSURE < 130 MM HG: ICD-10-PCS | Mod: ,,, | Performed by: FAMILY MEDICINE

## 2023-10-06 PROCEDURE — 99215 PR OFFICE/OUTPT VISIT, EST, LEVL V, 40-54 MIN: ICD-10-PCS | Mod: ,,, | Performed by: FAMILY MEDICINE

## 2023-10-06 PROCEDURE — 3008F BODY MASS INDEX DOCD: CPT | Mod: ,,, | Performed by: FAMILY MEDICINE

## 2023-10-06 PROCEDURE — 3044F PR MOST RECENT HEMOGLOBIN A1C LEVEL <7.0%: ICD-10-PCS | Mod: ,,, | Performed by: FAMILY MEDICINE

## 2023-10-06 PROCEDURE — 3074F SYST BP LT 130 MM HG: CPT | Mod: ,,, | Performed by: FAMILY MEDICINE

## 2023-10-06 PROCEDURE — 3008F PR BODY MASS INDEX (BMI) DOCUMENTED: ICD-10-PCS | Mod: ,,, | Performed by: FAMILY MEDICINE

## 2023-10-06 PROCEDURE — 3079F DIAST BP 80-89 MM HG: CPT | Mod: ,,, | Performed by: FAMILY MEDICINE

## 2023-10-06 RX ORDER — METOPROLOL SUCCINATE 200 MG/1
400 TABLET, EXTENDED RELEASE ORAL DAILY
Qty: 180 TABLET | Refills: 1 | Status: SHIPPED | OUTPATIENT
Start: 2023-10-06

## 2023-10-06 RX ORDER — NYSTATIN 100000 U/G
CREAM TOPICAL
Qty: 60 G | Refills: 5 | Status: SHIPPED | OUTPATIENT
Start: 2023-10-06

## 2023-10-06 RX ORDER — PANTOPRAZOLE SODIUM 40 MG/1
40 TABLET, DELAYED RELEASE ORAL 2 TIMES DAILY
Qty: 90 TABLET | Refills: 1 | Status: SHIPPED | OUTPATIENT
Start: 2023-10-06 | End: 2024-02-09 | Stop reason: SDUPTHER

## 2023-10-06 RX ORDER — TRIAMCINOLONE ACETONIDE 0.25 MG/G
CREAM TOPICAL 2 TIMES DAILY
Qty: 454 G | Refills: 1 | Status: SHIPPED | OUTPATIENT
Start: 2023-10-06

## 2023-10-06 RX ORDER — AMLODIPINE BESYLATE 10 MG/1
10 TABLET ORAL NIGHTLY
Qty: 90 TABLET | Refills: 1 | Status: SHIPPED | OUTPATIENT
Start: 2023-10-06

## 2023-10-06 RX ORDER — ROSUVASTATIN CALCIUM 10 MG/1
10 TABLET, COATED ORAL DAILY
Qty: 90 TABLET | Refills: 3 | Status: SHIPPED | OUTPATIENT
Start: 2023-10-06 | End: 2024-10-05

## 2023-10-06 RX ORDER — PANTOPRAZOLE SODIUM 40 MG/1
40 TABLET, DELAYED RELEASE ORAL DAILY
Qty: 30 TABLET | Refills: 11 | Status: SHIPPED | OUTPATIENT
Start: 2023-10-06 | End: 2023-10-06

## 2023-10-06 RX ORDER — HYDROGEN PEROXIDE 3 %
20 SOLUTION, NON-ORAL MISCELLANEOUS
COMMUNITY
End: 2023-10-06

## 2023-10-06 RX ORDER — MELOXICAM 15 MG/1
15 TABLET ORAL DAILY
Qty: 90 TABLET | Refills: 1 | Status: SHIPPED | OUTPATIENT
Start: 2023-10-06

## 2023-10-06 NOTE — ASSESSMENT & PLAN NOTE
Patient reports some indigestion but no pain in his epigastrium but he has noted black tarry stools with this to.  He is currently taking meloxicam.  Patient had previously been on Nexium 20 mg once daily.  Will discontinue the Nexium and start him on pantoprazole 40 mg twice daily.  Will check a CBC and a CMP on him.  GI referral.  Patient needs an EGD

## 2023-10-06 NOTE — PROGRESS NOTES
Will we appreciate brother (will be you would in [facility] was all we all   Zen Pacheco DO   [id number] [facility] [address]  [place]  [address]      PATIENT NAME: Maranda Clark  : 1965  DATE: 10/6/23  MRN: [id number]      Billing Provider: Zen Pacheco DO  Level of Service:   Patient PCP Information       Provider PCP Type    Zen Pacheco DO General            Reason for Visit / Chief Complaint: Hypertension (Patient is here for follow up hypertension.) and Nausea (Patient states he has been having nausea and vomiting off and on for a long time(more than 1 year). )       Update PCP  Update Chief Complaint         History of Present Illness / Problem Focused Workflow     Maranda Clark presents to the clinic with Hypertension (Patient is here for follow up hypertension.) and Nausea (Patient states he has been having nausea and vomiting off and on for a long time(more than 1 year). )     Patient reports that he has been having some black tarry stools and some indigestion or heartburn.  He does not report much pain in his epigastrium.  Has had history of colon polyps in the past and had colonoscopies.  It has been awhile.  He does report black tarry stools but he denies vomiting up blood but does have nausea on most days.  He states the Nexium does help somewhat.  He denies any numbness weakness or tingling.  He does have history of chronic arthritis and takes meloxicam.  Patient denies any chest pain shortness of breath PND orthopnea.    Hypertension  Pertinent negatives include no chest pain, headaches, neck pain, palpitations or shortness of breath.   Nausea  Associated symptoms include nausea and vomiting. Pertinent negatives include no abdominal pain, arthralgias, change in bowel habit, chest pain, chills, congestion, coughing, fatigue, fever, headaches, myalgias, neck pain, numbness, rash, sore throat, vertigo or weakness.       Review of Systems     Review of Systems    Constitutional:  Negative for activity change, appetite change, chills, fatigue and fever.   HENT:  Negative for nasal congestion, ear discharge, ear pain, mouth dryness, mouth sores, postnasal drip, sinus pressure/congestion, sore throat and voice change.    Eyes:  Negative for pain, discharge, redness, itching and visual disturbance.   Respiratory:  Negative for apnea, cough, chest tightness, shortness of breath and wheezing.    Cardiovascular:  Negative for chest pain, palpitations and leg swelling.   Gastrointestinal:  Positive for nausea and vomiting. Negative for abdominal distention, abdominal pain, anal bleeding, blood in stool, change in bowel habit, constipation, diarrhea and reflux.        Black bowel movements noted   Endocrine: Negative for cold intolerance, heat intolerance, polydipsia, polyphagia and polyuria.   Genitourinary:  Negative for difficulty urinating, enuresis, erectile dysfunction, frequency, genital sores, hematuria and urgency.   Musculoskeletal:  Negative for arthralgias, back pain, gait problem, leg pain, myalgias and neck pain.   Integumentary:  Negative for rash, mole/lesion, breast mass and breast discharge.   Allergic/Immunologic: Negative for environmental allergies and food allergies.   Neurological:  Negative for dizziness, vertigo, tremors, seizures, syncope, facial asymmetry, speech difficulty, weakness, light-headedness, numbness, headaches, memory loss and coordination difficulties.   Hematological:  Negative for adenopathy. Does not bruise/bleed easily.   Psychiatric/Behavioral:  Negative for agitation, behavioral problems, confusion, decreased concentration, dysphoric mood, hallucinations, self-injury, sleep disturbance and suicidal ideas. The patient is not nervous/anxious and is not hyperactive.    Breast: Negative for mass      Medical / Social / Family History     Past Medical History:   Diagnosis Date    Arthritis     GERD (gastroesophageal reflux disease)      Hypertension        Past Surgical History:   Procedure Laterality Date    CHOLECYSTECTOMY  2016       Social History    reports that he has never smoked. He has never been exposed to tobacco smoke. He has never used smokeless tobacco. He reports current alcohol use of about 6.0 standard drinks of alcohol per week. He reports that he does not use drugs.    Family History  's family history includes Diabetes in his sister; Heart attack in his mother; Hypertension in his daughter, daughter, daughter, mother, sister, sister, and sister.    Medications and Allergies     Medications  Outpatient Medications Marked as Taking for the 10/6/23 encounter (Office Visit) with Zen Pacheco, DO   Medication Sig Dispense Refill    [DISCONTINUED] amLODIPine (NORVASC) 10 MG tablet Take 1 tablet (10 mg total) by mouth every evening. 30 tablet 2    [DISCONTINUED] esomeprazole (NEXIUM 24HR) 20 MG capsule Take 20 mg by mouth before breakfast.      [DISCONTINUED] meloxicam (MOBIC) 15 MG tablet Take 15 mg by mouth Daily.      [DISCONTINUED] metoprolol succinate (TOPROL-XL) 200 MG 24 hr tablet Take 2 tablets (400 mg total) by mouth once daily. 60 tablet 2    [DISCONTINUED] nystatin (MYCOSTATIN) cream Apply topically 2 (two) times daily. 30 g 2    [DISCONTINUED] triamcinolone acetonide 0.025% (KENALOG) 0.025 % cream Apply topically 2 (two) times daily. Apply topically to back and waist area 80 g 0       Allergies  Review of patient's allergies indicates:  No Known Allergies    Physical Examination     Vitals:    10/06/23 0817   BP: 126/80   Pulse: 68   Resp: 20   Temp: 98.2 °F (36.8 °C)     Physical Exam  Constitutional:       General: He is not in acute distress.     Appearance: Normal appearance. He is normal weight.   HENT:      Head: Normocephalic.      Nose: Nose normal.      Mouth/Throat:      Mouth: Mucous membranes are moist.      Pharynx: Oropharynx is clear. No oropharyngeal exudate or posterior oropharyngeal erythema.    Eyes:      General: No scleral icterus.        Right eye: No discharge.         Left eye: No discharge.      Conjunctiva/sclera: Conjunctivae normal.      Pupils: Pupils are equal, round, and reactive to light.   Neck:      Vascular: No carotid bruit.   Cardiovascular:      Rate and Rhythm: Normal rate and regular rhythm.      Pulses: Normal pulses.      Heart sounds: Normal heart sounds. No murmur heard.  Pulmonary:      Effort: Pulmonary effort is normal.      Breath sounds: Normal breath sounds. No wheezing.   Abdominal:      General: Abdomen is flat. Bowel sounds are normal.      Tenderness: There is no abdominal tenderness. There is no guarding or rebound.      Hernia: No hernia is present.   Musculoskeletal:         General: Normal range of motion.      Cervical back: Normal range of motion and neck supple.      Right lower leg: No edema.      Left lower leg: No edema.   Skin:     General: Skin is warm.      Capillary Refill: Capillary refill takes less than 2 seconds.      Findings: No rash.   Neurological:      General: No focal deficit present.      Mental Status: He is alert and oriented to person, place, and time. Mental status is at baseline.   Psychiatric:         Mood and Affect: Mood normal.         Behavior: Behavior normal.         Thought Content: Thought content normal.         Judgment: Judgment normal.               Lab Results   Component Value Date    WBC 8.21 05/22/2023    HGB 15.3 05/22/2023    HCT 46.6 05/22/2023    MCV 91.4 05/22/2023     05/22/2023          Sodium   Date Value Ref Range Status   05/22/2023 139 136 - 145 mmol/L Final     Potassium   Date Value Ref Range Status   05/22/2023 3.8 3.5 - 5.1 mmol/L Final     Chloride   Date Value Ref Range Status   05/22/2023 108 (H) 98 - 107 mmol/L Final     CO2   Date Value Ref Range Status   05/22/2023 27 21 - 32 mmol/L Final     Glucose   Date Value Ref Range Status   05/22/2023 85 74 - 106 mg/dL Final     BUN   Date Value Ref  Range Status   05/22/2023 14 7 - 18 mg/dL Final     Creatinine   Date Value Ref Range Status   05/22/2023 1.10 0.70 - 1.30 mg/dL Final     Calcium   Date Value Ref Range Status   05/22/2023 10.0 8.5 - 10.1 mg/dL Final     Total Protein   Date Value Ref Range Status   05/22/2023 7.9 6.4 - 8.2 g/dL Final     Albumin   Date Value Ref Range Status   05/22/2023 3.8 3.5 - 5.0 g/dL Final     Bilirubin, Total   Date Value Ref Range Status   05/22/2023 0.9 >0.0 - 1.2 mg/dL Final     Alk Phos   Date Value Ref Range Status   05/22/2023 70 45 - 115 U/L Final     AST   Date Value Ref Range Status   05/22/2023 30 15 - 37 U/L Final     ALT   Date Value Ref Range Status   05/22/2023 32 16 - 61 U/L Final     Anion Gap   Date Value Ref Range Status   05/22/2023 8 7 - 16 mmol/L Final     eGFR    Date Value Ref Range Status   10/28/2020 77       Comment:     The above 11 analytes were performed by [facility] [id number] [place] [id number]     eGFR   Date Value Ref Range Status   06/27/2022 77 >=60 mL/min/1.73m² Final      Colonoscopy  Addendum: Procedure Date   4/10/23     Impression   Overall Impression: Diverticula were present in the sigmoid and descending  colon. Three polyps were removed with biopsy, from the ascending,  transverse and sigmoid colon. One semi-pedunculated polyp was removed from  the sigmoid colon with hot snare.     Recommendation    Await pathology results     Repeat colonoscopy in 3 years      High fiber diet   Discharge   Disp: DC to home in stable condition. Resume diet. No driving x 24 hours.  F/U with PCP as scheduled.   Dx: History of colon polyps; colon diverticulosis, four polyps were  removed on this exam.     Indication   Colon cancer screening; history of colon polyps     Providers   Radhika Morales Technician    LESLIE Mehta CRNA, RN Registered Nurse    Rudy Sampson MD Proceduralist      Medications   Moderate sedation administered by  anesthesia staff - See anesthesia  record.     Preprocedure   A history and physical has been performed, and patient medication  allergies have been reviewed. The patient's tolerance of previous  anesthesia has been reviewed. The risks and benefits of the procedure and  the sedation options and risks were discussed with the patient. All  questions were answered and informed consent obtained.     ASA Score: ASA 2 - Patient with mild systemic disease with no functional  limitations   Mallampati Airway Score: II (hard and soft palate, upper portion of  tonsils anduvula visible)     Details of the Procedure   The patient underwent monitored anesthesia care, which was administered by  an anesthesia professional. The patient's heart rate, blood pressure,  level of consciousness, respirations, oxygen, ECG and ETCO2 were monitored  throughout the procedure. A digital rectal exam was performed. A perianal  exam was performed. The scope was introduced through the anus and advanced  to the cecum. Retroflexion was performed in the rectum. The quality of  bowel preparation was evaluated using the [place] Bowel Preparation Scale  with scores of: right colon = 2, transverse colon = 2, left colon = 2. The  total BBPS score was 6. Bowel prep was adequate. The patient's estimated  blood loss was minimal (<5 mL). The procedure was not difficult. The  patient tolerated the procedure well. There were no apparent  complications.      Scope: Colonoscope   Scope Serial: [id number]     Events   Procedure Events    Event Event Time      Procedure Events    Event Event Time    ENDO SCOPE IN TIME 4/10/2023 11:12 AM    ENDO CECUM REACHED 4/10/2023 11:16 AM    ENDO SCOPE OUT TIME 4/10/2023 11:24 AM      CECAL WITHDRAWAL TIME: 8m 44s     Findings   One polyp in the ascending colon; performed cold forceps biopsy   One polyp in the transverse colon; performed cold forceps biopsy   Two polyps in the sigmoid colon; performed cold forceps biopsy; removed  by  hot snare   Diverticula in the descending colon and sigmoid colon; no bleeding was  identified  Narrative: Table formatting from the original result was not included.  Procedure Date  4/10/23    Impression  Overall   Impression:  Diverticula were present in the sigmoid and descending   colon. Three polyps were removed with biopsy, from the ascending,   transverse and sigmoid colon. One semi-pedunculated polyp was removed from   the sigmoid colon with hot snare.    Recommendation   Await pathology results    Repeat colonoscopy in 5 years     High fiber diet  Discharge  Disp: DC to home in stable condition. Resume diet. No driving x 24 hours.   F/U with PCP as scheduled.  Dx: History of colon polyps; colon diverticulosis, four polyps were   removed on this exam.    Indication  Colon cancer screening; history of colon polyps    Providers  Radhika Morales Technician   LESLIE Mehta CRNA, RN Registered Nurse   Rudy Sampson MD Proceduralist     Medications  Moderate sedation administered by anesthesia staff - See anesthesia   record.    Preprocedure  A history and physical has been performed, and patient medication   allergies have been reviewed. The patient's tolerance of previous   anesthesia has been reviewed. The risks and benefits of the procedure and   the sedation options and risks were discussed with the patient. All   questions were answered and informed consent obtained.    ASA Score: ASA 2 - Patient with mild systemic disease with no functional   limitations  Mallampati Airway Score: II (hard and soft palate, upper portion of   tonsils anduvula visible)    Details of the Procedure  The patient underwent monitored anesthesia care, which was administered by   an anesthesia professional. The patient's heart rate, blood pressure,   level of consciousness, respirations, oxygen, ECG and ETCO2 were monitored   throughout the procedure. A digital rectal exam was performed. A perianal    exam was performed. The scope was introduced through the anus and advanced   to the cecum. Retroflexion was performed in the rectum. The quality of   bowel preparation was evaluated using the [place] Bowel Preparation Scale   with scores of: right colon = 2, transverse colon = 2, left colon = 2. The   total BBPS score was 6. Bowel prep was adequate. The patient's estimated   blood loss was minimal (<5 mL). The procedure was not difficult. The   patient tolerated the procedure well. There were no apparent   complications.     Scope: Colonoscope  Scope Serial: [id number]    Events  Procedure Events   Event Event Time     Procedure Events   Event Event Time   ENDO SCOPE IN TIME 4/10/2023 11:12 AM   ENDO CECUM REACHED 4/10/2023 11:16 AM   ENDO SCOPE OUT TIME 4/10/2023 11:24 AM     CECAL WITHDRAWAL TIME: 8m 44s    Findings  One polyp in the ascending colon; performed cold forceps biopsy  One polyp in the transverse colon; performed cold forceps biopsy  Two polyps in the sigmoid colon; performed cold forceps biopsy; removed by   hot snare  Diverticula in the descending colon and sigmoid colon; no bleeding was   identified     Procedures   Lab Results   Component Value Date    CHOL 152 02/13/2023    CHOL 186 01/09/2023    CHOL 208 (H) 06/27/2022     Lab Results   Component Value Date    HDL 54 02/13/2023    HDL 51 01/09/2023    HDL 51 06/27/2022     Lab Results   Component Value Date    LDLCALC 88 02/13/2023    LDLCALC 124 01/09/2023    LDLCALC 145 06/27/2022     Lab Results   Component Value Date    TRIG 52 02/13/2023    TRIG 56 01/09/2023    TRIG 61 06/27/2022     Lab Results   Component Value Date    CHOLHDL 2.8 02/13/2023    CHOLHDL 3.6 01/09/2023    CHOLHDL 4.1 06/27/2022      Lab Results   Component Value Date    HGBA1C 5.8 01/09/2023      Lab Results   Component Value Date    TSH 2.470 01/09/2023      Assessment and Plan (including Health Maintenance)      Problem List  Smart Sets  Document Outside HM   :    Plan:          Health Maintenance Due   Topic Date Due    Influenza Vaccine (1) 09/01/2023    COVID-19 Vaccine (3 - 2023-24 season) 09/01/2023       Problem List Items Addressed This Visit          Derm    Cutaneous candidiasis    Current Assessment & Plan     History of cutaneous candidiasis will continue him on nystatin cream.  Follow-up every 3 months.         Relevant Medications    nystatin (MYCOSTATIN) cream       Cardiac/Vascular    Hypertension    Current Assessment & Plan     Hypertension well controlled no change in medication.  Follow-up in 3 months.  Will check CBC and CMP yearly.  Goal is blood pressure 120/70         Relevant Medications    metoprolol succinate (TOPROL-XL) 200 MG 24 hr tablet    amLODIPine (NORVASC) 10 MG tablet    Mixed hyperlipidemia    Overview     AHA 10 year risk is 10.5 %         Current Assessment & Plan     Last cholesterol was 152 with the LDL of 88 and HDL of 54.  Patient's American heart association 10 year risk is 10.5% therefore will place him on Crestor 10 mg once daily to TM to get his LDL is in the 70 range.  Follow-up in 3 months.         Relevant Medications    rosuvastatin (CRESTOR) 10 MG tablet    Other Relevant Orders    Lipid Panel       GI    History of colon polyps    Current Assessment & Plan     History of colon will need repeat colonoscopy.  Patient has had some black tarry stools.  Referral back to GI.  CBC CMP.         PUD (peptic ulcer disease) - Primary    Current Assessment & Plan     Patient reports some indigestion but no pain in his epigastrium but he has noted black tarry stools with this to.  He is currently taking meloxicam.  Patient had previously been on Nexium 20 mg once daily.  Will discontinue the Nexium and start him on pantoprazole 40 mg twice daily.  Will check a CBC and a CMP on him.  GI referral.  Patient needs an EGD         Relevant Medications    pantoprazole (PROTONIX) 40 MG tablet    Other Relevant Orders    CBC Auto Differential     Comprehensive Metabolic Panel       Orthopedic    Arthritis    Current Assessment & Plan     History of arthritis currently taking meloxicam for that.  He states it works for him her never been taking for years.  Likely is etiology possible peptic ulcer disease.  Continue the medication and start him on pantoprazole twice daily.  Check a CBC         Relevant Medications    meloxicam (MOBIC) 15 MG tablet     Other Visit Diagnoses       Dermatitis        Relevant Medications    triamcinolone acetonide 0.025% (KENALOG) 0.025 % cream            Health Maintenance Topics with due status: Not Due       Topic Last Completion Date    TETANUS VACCINE 01/27/2022    Hemoglobin A1c (Prediabetes) 01/09/2023    Lipid Panel 02/13/2023    Colorectal Cancer Screening 04/11/2023       Future Appointments   Date Time Provider Department Center   2/14/2024  1:40 PM Zen Pacheco DO [facility]        Follow up in about 4 weeks (around 11/3/2023).     Signature:  DO [facility]  [address] [place] [address]  [id number]    Date of encounter: 10/6/23

## 2023-10-06 NOTE — ASSESSMENT & PLAN NOTE
History of colon will need repeat colonoscopy.  Patient has had some black tarry stools.  Referral back to GI.  CBC CMP.

## 2023-10-06 NOTE — ASSESSMENT & PLAN NOTE
History of arthritis currently taking meloxicam for that.  He states it works for him her never been taking for years.  Likely is etiology possible peptic ulcer disease.  Continue the medication and start him on pantoprazole twice daily.  Check a CBC

## 2023-10-06 NOTE — ASSESSMENT & PLAN NOTE
Last cholesterol was 152 with the LDL of 88 and HDL of 54.  Patient's American heart association 10 year risk is 10.5% therefore will place him on Crestor 10 mg once daily to TM to get his LDL is in the 70 range.  Follow-up in 3 months.

## 2024-02-09 ENCOUNTER — OFFICE VISIT (OUTPATIENT)
Dept: FAMILY MEDICINE | Facility: CLINIC | Age: 59
End: 2024-02-09
Payer: COMMERCIAL

## 2024-02-09 VITALS
HEART RATE: 61 BPM | TEMPERATURE: 99 F | OXYGEN SATURATION: 100 % | HEIGHT: 72 IN | WEIGHT: 223.19 LBS | RESPIRATION RATE: 18 BRPM | BODY MASS INDEX: 30.23 KG/M2 | SYSTOLIC BLOOD PRESSURE: 127 MMHG | DIASTOLIC BLOOD PRESSURE: 81 MMHG

## 2024-02-09 DIAGNOSIS — K27.9 PUD (PEPTIC ULCER DISEASE): ICD-10-CM

## 2024-02-09 DIAGNOSIS — M25.551 BILATERAL HIP PAIN: Primary | ICD-10-CM

## 2024-02-09 DIAGNOSIS — M25.552 BILATERAL HIP PAIN: Primary | ICD-10-CM

## 2024-02-09 DIAGNOSIS — M19.90 ARTHRITIS: ICD-10-CM

## 2024-02-09 DIAGNOSIS — I10 PRIMARY HYPERTENSION: ICD-10-CM

## 2024-02-09 DIAGNOSIS — E78.2 MIXED HYPERLIPIDEMIA: ICD-10-CM

## 2024-02-09 DIAGNOSIS — L30.9 DERMATITIS: ICD-10-CM

## 2024-02-09 PROCEDURE — 3079F DIAST BP 80-89 MM HG: CPT | Mod: CPTII,,, | Performed by: FAMILY MEDICINE

## 2024-02-09 PROCEDURE — 3074F SYST BP LT 130 MM HG: CPT | Mod: CPTII,,, | Performed by: FAMILY MEDICINE

## 2024-02-09 PROCEDURE — 1159F MED LIST DOCD IN RCRD: CPT | Mod: CPTII,,, | Performed by: FAMILY MEDICINE

## 2024-02-09 PROCEDURE — 3008F BODY MASS INDEX DOCD: CPT | Mod: CPTII,,, | Performed by: FAMILY MEDICINE

## 2024-02-09 PROCEDURE — 99214 OFFICE O/P EST MOD 30 MIN: CPT | Mod: ,,, | Performed by: FAMILY MEDICINE

## 2024-02-09 RX ORDER — CLOBETASOL PROPIONATE 0.5 MG/G
OINTMENT TOPICAL 2 TIMES DAILY
Qty: 30 G | Refills: 5 | Status: SHIPPED | OUTPATIENT
Start: 2024-02-09

## 2024-02-09 RX ORDER — PANTOPRAZOLE SODIUM 40 MG/1
40 TABLET, DELAYED RELEASE ORAL 2 TIMES DAILY
Qty: 90 TABLET | Refills: 1 | Status: SHIPPED | OUTPATIENT
Start: 2024-02-09 | End: 2025-02-08

## 2024-02-09 NOTE — ASSESSMENT & PLAN NOTE
X-rays of the hip reveal degenerative arthritis bilaterally with the left being worse than right.  I suspicion is hip pain is related to the osteoarthritis.  Referral for orthopedic evaluation.  Patient has peptic ulcer disease so we have advised him to not use meloxicam but use either acetaminophen or Tylenol Arthritis

## 2024-02-09 NOTE — PROGRESS NOTES
Zen Pacheco DO   [address] [place]  [address]      PATIENT NAME: Maranda Clark  : 1965  DATE: 24  MRN: [id number]      Billing Provider: Zen Pacheco DO  Level of Service:   Patient PCP Information       Provider PCP Type    Zen Pacheco DO General            Reason for Visit / Chief Complaint: Follow-up (Patient states he needs a 4 month follow-up on ulcers.) and Arthritis (Patient states he has started having arthritis pain in his right leg.)       Update PCP  Update Chief Complaint         History of Present Illness / Problem Focused Workflow     Maranda Clark presents to the clinic with Follow-up (Patient states he needs a 4 month follow-up on ulcers.) and Arthritis (Patient states he has started having arthritis pain in his right leg.)     Patient is in for follow-up on his hips.  He is still has pain in both hips more on the right than the left and hurts when he takes long walks.  He also complains of itching alone his lower abdomen without any rash that responded to clobetasol in the past.  Patient's blood pressures been well controlled.  Patient denies any chest pain shortness on breath palpitations PND orthopnea.        Review of Systems     Review of Systems   Constitutional:  Negative for activity change, appetite change, chills, fatigue and fever.   HENT:  Negative for nasal congestion, ear discharge, ear pain, mouth dryness, mouth sores, postnasal drip, sinus pressure/congestion, sore throat and voice change.    Eyes:  Negative for pain, discharge, redness, itching and visual disturbance.   Respiratory:  Negative for apnea, cough, chest tightness, shortness of breath and wheezing.    Cardiovascular:  Negative for chest pain, palpitations and leg swelling.   Gastrointestinal:  Negative for abdominal distention, abdominal pain, anal bleeding, blood in stool, change in bowel habit, constipation, diarrhea, nausea, vomiting and reflux.    Endocrine: Negative for cold intolerance, heat intolerance, polydipsia, polyphagia and polyuria.   Genitourinary:  Negative for difficulty urinating, enuresis, erectile dysfunction, frequency, genital sores, hematuria and urgency.   Musculoskeletal:  Positive for joint swelling, leg pain and joint deformity. Negative for arthralgias, back pain, gait problem, myalgias and neck pain.   Integumentary:  Negative for rash, mole/lesion, breast mass and breast discharge.   Allergic/Immunologic: Negative for environmental allergies and food allergies.   Neurological:  Negative for dizziness, vertigo, tremors, seizures, syncope, facial asymmetry, speech difficulty, weakness, light-headedness, numbness, headaches, memory loss and coordination difficulties.   Hematological:  Negative for adenopathy. Does not bruise/bleed easily.   Psychiatric/Behavioral:  Negative for agitation, behavioral problems, confusion, decreased concentration, dysphoric mood, hallucinations, self-injury, sleep disturbance and suicidal ideas. The patient is not nervous/anxious and is not hyperactive.    Breast: Negative for mass      Medical / Social / Family History     Past Medical History:   Diagnosis Date    Arthritis     GERD (gastroesophageal reflux disease)     Hypertension        Past Surgical History:   Procedure Laterality Date    CHOLECYSTECTOMY  2016       Social History    reports that he has never smoked. He has never been exposed to tobacco smoke. He has never used smokeless tobacco. He reports current alcohol use of about 6.0 standard drinks of alcohol per week. He reports that he does not use drugs.    Family History  's family history includes Diabetes in his sister; Heart attack in his mother; Hypertension in his daughter, daughter, daughter, mother, sister, sister, and sister.    Medications and Allergies     Medications  Outpatient Medications Marked as Taking for the 2/9/24 encounter (Office Visit) with Zen Pacheco,  DO   Medication Sig Dispense Refill    amLODIPine (NORVASC) 10 MG tablet Take 1 tablet (10 mg total) by mouth every evening. 90 tablet 1    meloxicam (MOBIC) 15 MG tablet Take 1 tablet (15 mg total) by mouth Daily. 90 tablet 1    metoprolol succinate (TOPROL-XL) 200 MG 24 hr tablet Take 2 tablets (400 mg total) by mouth once daily. 180 tablet 1    nystatin (MYCOSTATIN) cream Apply topically to areas on back and waist 60 g 5    rosuvastatin (CRESTOR) 10 MG tablet Take 1 tablet (10 mg total) by mouth once daily. 90 tablet 3    triamcinolone acetonide 0.025% (KENALOG) 0.025 % cream Apply topically 2 (two) times daily. Apply topically to back and waist area 454 g 1    [DISCONTINUED] pantoprazole (PROTONIX) 40 MG tablet Take 1 tablet (40 mg total) by mouth 2 (two) times daily. Take twice daily for 6 weeks then daily 90 tablet 1       Allergies  Review of patient's allergies indicates:  No Known Allergies    Physical Examination     Vitals:    02/09/24 1415   BP: 127/81   Pulse: 61   Resp: 18   Temp: 99.1 °F (37.3 °C)     Physical Exam  Constitutional:       General: He is not in acute distress.     Appearance: Normal appearance. He is obese.   HENT:      Head: Normocephalic.      Nose: Nose normal.      Mouth/Throat:      Mouth: Mucous membranes are moist.      Pharynx: Oropharynx is clear. No oropharyngeal exudate or posterior oropharyngeal erythema.   Eyes:      General: No scleral icterus.        Right eye: No discharge.         Left eye: No discharge.      Conjunctiva/sclera: Conjunctivae normal.      Pupils: Pupils are equal, round, and reactive to light.   Cardiovascular:      Rate and Rhythm: Normal rate and regular rhythm.      Pulses: Normal pulses.      Heart sounds: Normal heart sounds. No murmur heard.  Pulmonary:      Effort: Pulmonary effort is normal.      Breath sounds: Normal breath sounds. No wheezing.   Abdominal:      General: Abdomen is flat. Bowel sounds are normal.      Tenderness: There is no  abdominal tenderness.   Musculoskeletal:         General: Tenderness present. Normal range of motion.      Cervical back: Normal range of motion.   Skin:     General: Skin is warm.      Capillary Refill: Capillary refill takes less than 2 seconds.      Findings: No lesion or rash.   Neurological:      General: No focal deficit present.      Mental Status: He is alert and oriented to person, place, and time. Mental status is at baseline.   Psychiatric:         Mood and Affect: Mood normal.         Behavior: Behavior normal.               Lab Results   Component Value Date    WBC 8.21 05/22/2023    HGB 15.3 05/22/2023    HCT 46.6 05/22/2023    MCV 91.4 05/22/2023     05/22/2023          Sodium   Date Value Ref Range Status   05/22/2023 139 136 - 145 mmol/L Final     Potassium   Date Value Ref Range Status   05/22/2023 3.8 3.5 - 5.1 mmol/L Final     Chloride   Date Value Ref Range Status   05/22/2023 108 (H) 98 - 107 mmol/L Final     CO2   Date Value Ref Range Status   05/22/2023 27 21 - 32 mmol/L Final     Glucose   Date Value Ref Range Status   05/22/2023 85 74 - 106 mg/dL Final     BUN   Date Value Ref Range Status   05/22/2023 14 7 - 18 mg/dL Final     Creatinine   Date Value Ref Range Status   05/22/2023 1.10 0.70 - 1.30 mg/dL Final     Calcium   Date Value Ref Range Status   05/22/2023 10.0 8.5 - 10.1 mg/dL Final     Total Protein   Date Value Ref Range Status   05/22/2023 7.9 6.4 - 8.2 g/dL Final     Albumin   Date Value Ref Range Status   05/22/2023 3.8 3.5 - 5.0 g/dL Final     Bilirubin, Total   Date Value Ref Range Status   05/22/2023 0.9 >0.0 - 1.2 mg/dL Final     Alk Phos   Date Value Ref Range Status   05/22/2023 70 45 - 115 U/L Final     AST   Date Value Ref Range Status   05/22/2023 30 15 - 37 U/L Final     ALT   Date Value Ref Range Status   05/22/2023 32 16 - 61 U/L Final     Anion Gap   Date Value Ref Range Status   05/22/2023 8 7 - 16 mmol/L Final     eGFR    Date Value Ref  Range Status   10/28/2020 77       Comment:     The above 11 analytes were performed by [facility] [id number] [place] [address],[place] [id number]     eGFR   Date Value Ref Range Status   06/27/2022 77 >=60 mL/min/1.73m² Final      X-Ray Hip 1 View Right (with Pelvis when performed)  Narrative: EXAMINATION:  XR HIP WITH PELVIS WHEN PERFORMED, 1 VIEW RIGHT    CLINICAL HISTORY:  Pain in right hip    TECHNIQUE:  Right hip with AP pelvis, three views:    COMPARISON:  01/13/2023    FINDINGS:  Osteoarthritis is present in the right hip which has developed since the previous examination.  No fractures are seen in the right hip or elsewhere in the bony pelvis.  SI joints are normal.  Osteoarthritis is also partially visualized in the left hip.  Impression: Osteoarthritis is present in both hips, right side greater than left.    Place of service: [facility]    Electronically signed by: Mireille Cancino  Date:    02/09/2024  Time:    16:00     Procedures   Lab Results   Component Value Date    CHOL 152 02/13/2023    CHOL 186 01/09/2023    CHOL 208 (H) 06/27/2022     Lab Results   Component Value Date    HDL 54 02/13/2023    HDL 51 01/09/2023    HDL 51 06/27/2022     Lab Results   Component Value Date    LDLCALC 88 02/13/2023    LDLCALC 124 01/09/2023    LDLCALC 145 06/27/2022     Lab Results   Component Value Date    TRIG 52 02/13/2023    TRIG 56 01/09/2023    TRIG 61 06/27/2022     Lab Results   Component Value Date    CHOLHDL 2.8 02/13/2023    CHOLHDL 3.6 01/09/2023    CHOLHDL 4.1 06/27/2022      Lab Results   Component Value Date    HGBA1C 5.8 01/09/2023      Lab Results   Component Value Date    TSH 2.470 01/09/2023      Assessment and Plan (including Health Maintenance)      Problem List  Smart Sets  Document Outside HM   :    Plan:         Health Maintenance Due   Topic Date Due    Influenza Vaccine (1) 09/01/2023    COVID-19 Vaccine (3 - 2023-24 season) 09/01/2023    Hemoglobin A1c (Prediabetes)  01/09/2024        Problem List Items Addressed This Visit          Derm    Dermatitis    Current Assessment & Plan     Irritant dermatitis that is have responded to clobetasol in the past.  Refilled his prescription.  Follow-up every 6 months.         Relevant Medications    clobetasol 0.05% (TEMOVATE) 0.05 % Oint       Cardiac/Vascular    Hypertension    Current Assessment & Plan     Hypertension well controlled no change in medication.  Follow-up in 3 months.  Will check CBC and CMP yearly.  Goal is blood pressure 120/70 .  Continue amlodipine and metoprolol.         Mixed hyperlipidemia    Overview     AHA 10 year risk is 10.5 %         Current Assessment & Plan     Lab Results   Component Value Date    LDLCALC 88 02/13/2023    Currently on rosuvastatin and will continue that dose.  Recheck a lipid in 3 months.            GI    PUD (peptic ulcer disease)    Current Assessment & Plan     History of peptic ulcer disease much improved on pantoprazole.  Continue current dose.  Follow-up in 3 months or p.r.n.         Relevant Medications    pantoprazole (PROTONIX) 40 MG tablet       Orthopedic    Arthritis    Current Assessment & Plan     X-rays of the hip reveal degenerative arthritis bilaterally with the left being worse than right.  I suspicion is hip pain is related to the osteoarthritis.  Referral for orthopedic evaluation.  Patient has peptic ulcer disease so we have advised him to not use meloxicam but use either acetaminophen or Tylenol Arthritis          Other Visit Diagnoses       Bilateral hip pain    -  Primary    Relevant Orders    X-Ray Hip 1 View Right (with Pelvis when performed) (Completed)    X-Ray Hip 1 View Left (with Pelvis when performed)    Ambulatory referral/consult to Orthopedics            Health Maintenance Topics with due status: Not Due       Topic Last Completion Date    TETANUS VACCINE 01/27/2022    Lipid Panel 02/13/2023    Colorectal Cancer Screening 04/11/2023       Future Appointments   Date  Time Provider Department Center   2/14/2024  1:40 PM Zen Pacheco,  [facility]        Follow up in about 3 months (around 5/9/2024), or if symptoms worsen or fail to improve.     Signature:  Zen Pacheco DO  [address] [place]  [address]    Date of encounter: 2/9/24

## 2024-02-09 NOTE — ASSESSMENT & PLAN NOTE
History of peptic ulcer disease much improved on pantoprazole.  Continue current dose.  Follow-up in 3 months or p.r.n.

## 2024-02-09 NOTE — ASSESSMENT & PLAN NOTE
Hypertension well controlled no change in medication.  Follow-up in 3 months.  Will check CBC and CMP yearly.  Goal is blood pressure 120/70 .  Continue amlodipine and metoprolol.

## 2024-02-09 NOTE — ASSESSMENT & PLAN NOTE
Irritant dermatitis that is have responded to clobetasol in the past.  Refilled his prescription.  Follow-up every 6 months.

## 2024-02-09 NOTE — ASSESSMENT & PLAN NOTE
Lab Results   Component Value Date    LDLCALC 88 02/13/2023    Currently on rosuvastatin and will continue that dose.  Recheck a lipid in 3 months.

## 2024-02-19 ENCOUNTER — OFFICE VISIT (OUTPATIENT)
Dept: ORTHOPEDICS | Facility: CLINIC | Age: 59
End: 2024-02-19
Payer: COMMERCIAL

## 2024-02-19 VITALS — HEIGHT: 72 IN | BODY MASS INDEX: 30.2 KG/M2 | WEIGHT: 223 LBS

## 2024-02-19 DIAGNOSIS — M70.62 TROCHANTERIC BURSITIS OF BOTH HIPS: ICD-10-CM

## 2024-02-19 DIAGNOSIS — M70.61 TROCHANTERIC BURSITIS OF BOTH HIPS: ICD-10-CM

## 2024-02-19 DIAGNOSIS — M19.90 ARTHRITIS: ICD-10-CM

## 2024-02-19 PROCEDURE — 1160F RVW MEDS BY RX/DR IN RCRD: CPT | Mod: CPTII,,, | Performed by: NURSE PRACTITIONER

## 2024-02-19 PROCEDURE — 99203 OFFICE O/P NEW LOW 30 MIN: CPT | Mod: S$PBB,,, | Performed by: NURSE PRACTITIONER

## 2024-02-19 PROCEDURE — 1159F MED LIST DOCD IN RCRD: CPT | Mod: CPTII,,, | Performed by: NURSE PRACTITIONER

## 2024-02-19 PROCEDURE — 3008F BODY MASS INDEX DOCD: CPT | Mod: CPTII,,, | Performed by: NURSE PRACTITIONER

## 2024-02-19 PROCEDURE — 99214 OFFICE O/P EST MOD 30 MIN: CPT | Mod: PBBFAC | Performed by: NURSE PRACTITIONER

## 2024-02-19 NOTE — PROGRESS NOTES
58-year-old male patient presents ambulatory orthopedic clinic complaint of bilateral hip pain.  Left hip began hurting a proximally 1-2 months ago.  Right hip pain began several months ago.  Denies any injury or trauma.  She denies fever, chills or any sign or symptom of infection.  He does use meloxicam.  X-ray of the right hip on 02/09/2024 showed mild DJD changes.  No evidence acute fracture, dislocation pathologic bone.  X-rays left hip 02/09/2024 showed mild to moderate DJD changes.  No evidence acute fracture, dislocation or pathologic bone noted.    PE:  Physical exam is noted be ambulating independently no perceptible limp.  Physical exam of his right lower extremity hip has good motion all planes without elicitation of pain.  There is tenderness palpation over the area of the greater trochanter.  Physical exam of left hip has good external rotation.  He has minimal internal rotation past neutral.  Pain is elicited with internal rotation.  There is tenderness palpation over the greater trochanter.      Impression:  1.)  DJD bilateral hip 2.) trochanteric bursitis-bilateral hip    Plan:  Safety guidelines and activity restrictions are discussed with the patient.  He was cautioned to avoid laying on her sides.  He was cautioned to avoid crossing his legs over.  Continue meloxicam as labeled.  May add acetaminophen over-the-counter per label instructions to his medical regimen.  We discussed intrabursal steroid injections.  He declines at this time.  States he will call back for scheduling if he chooses to pursue that route.  He does understand at some point in the future he was properly facing total hip replacement arthroplasty we will need to discuss that with orthopedic surgery.

## 2024-02-19 NOTE — PATIENT INSTRUCTIONS
Safety guidelines and activity restrictions are discussed with the patient.  He was cautioned to avoid laying on her sides.  He was cautioned to avoid crossing his legs over.  Continue meloxicam as labeled.  May add acetaminophen over-the-counter per label instructions to his medical regimen.  We discussed intrabursal steroid injections.  He declines at this time.  States he will call back for scheduling if he chooses to pursue that route.  He does understand at some point in the future he was properly facing total hip replacement arthroplasty we will need to discuss that with orthopedic surgery.

## 2024-03-20 ENCOUNTER — OFFICE VISIT (OUTPATIENT)
Dept: FAMILY MEDICINE | Facility: CLINIC | Age: 59
End: 2024-03-20
Payer: COMMERCIAL

## 2024-03-20 VITALS
SYSTOLIC BLOOD PRESSURE: 150 MMHG | WEIGHT: 229.81 LBS | TEMPERATURE: 98 F | HEART RATE: 50 BPM | OXYGEN SATURATION: 99 % | BODY MASS INDEX: 31.13 KG/M2 | DIASTOLIC BLOOD PRESSURE: 95 MMHG | RESPIRATION RATE: 19 BRPM | HEIGHT: 72 IN

## 2024-03-20 DIAGNOSIS — N52.02 CORPORO-VENOUS OCCLUSIVE ERECTILE DYSFUNCTION: ICD-10-CM

## 2024-03-20 DIAGNOSIS — B35.4: ICD-10-CM

## 2024-03-20 DIAGNOSIS — K27.9 PUD (PEPTIC ULCER DISEASE): ICD-10-CM

## 2024-03-20 DIAGNOSIS — I10 PRIMARY HYPERTENSION: ICD-10-CM

## 2024-03-20 DIAGNOSIS — R73.03 PREDIABETES: ICD-10-CM

## 2024-03-20 DIAGNOSIS — E78.2 MIXED HYPERLIPIDEMIA: ICD-10-CM

## 2024-03-20 DIAGNOSIS — Z00.00 ROUTINE GENERAL MEDICAL EXAMINATION AT A HEALTH CARE FACILITY: Primary | ICD-10-CM

## 2024-03-20 LAB
ALBUMIN SERPL BCP-MCNC: 4.4 G/DL (ref 3.5–5)
ALBUMIN/GLOB SERPL: 1.1 {RATIO}
ALP SERPL-CCNC: 80 U/L (ref 45–115)
ALT SERPL W P-5'-P-CCNC: 36 U/L (ref 16–61)
ANION GAP SERPL CALCULATED.3IONS-SCNC: 9 MMOL/L (ref 7–16)
AST SERPL W P-5'-P-CCNC: 21 U/L (ref 15–37)
BASOPHILS # BLD AUTO: 0.1 K/UL (ref 0–0.2)
BASOPHILS NFR BLD AUTO: 1 % (ref 0–1)
BILIRUB SERPL-MCNC: 0.3 MG/DL (ref ?–1.2)
BUN SERPL-MCNC: 13 MG/DL (ref 7–18)
BUN/CREAT SERPL: 11 (ref 6–20)
CALCIUM SERPL-MCNC: 10.3 MG/DL (ref 8.5–10.1)
CHLORIDE SERPL-SCNC: 108 MMOL/L (ref 98–107)
CHOLEST SERPL-MCNC: 135 MG/DL (ref 0–200)
CHOLEST/HDLC SERPL: 2.3 {RATIO}
CO2 SERPL-SCNC: 28 MMOL/L (ref 21–32)
CREAT SERPL-MCNC: 1.17 MG/DL (ref 0.7–1.3)
DIFFERENTIAL METHOD BLD: ABNORMAL
EGFR (NO RACE VARIABLE) (RUSH/TITUS): 72 ML/MIN/1.73M2
EOSINOPHIL # BLD AUTO: 0.7 K/UL (ref 0–0.5)
EOSINOPHIL NFR BLD AUTO: 7.2 % (ref 1–4)
ERYTHROCYTE [DISTWIDTH] IN BLOOD BY AUTOMATED COUNT: 13.2 % (ref 11.5–14.5)
GLOBULIN SER-MCNC: 4 G/DL (ref 2–4)
GLUCOSE SERPL-MCNC: 93 MG/DL (ref 74–106)
HCT VFR BLD AUTO: 42.8 % (ref 40–54)
HDLC SERPL-MCNC: 59 MG/DL (ref 40–60)
HGB BLD-MCNC: 14.7 G/DL (ref 13.5–18)
IMM GRANULOCYTES # BLD AUTO: 0.02 K/UL (ref 0–0.04)
IMM GRANULOCYTES NFR BLD: 0.2 % (ref 0–0.4)
LDLC SERPL CALC-MCNC: 67 MG/DL
LDLC/HDLC SERPL: 1.1 {RATIO}
LYMPHOCYTES # BLD AUTO: 3.61 K/UL (ref 1–4.8)
LYMPHOCYTES NFR BLD AUTO: 36.9 % (ref 27–41)
MCH RBC QN AUTO: 30.4 PG (ref 27–31)
MCHC RBC AUTO-ENTMCNC: 34.3 G/DL (ref 32–36)
MCV RBC AUTO: 88.4 FL (ref 80–96)
MONOCYTES # BLD AUTO: 1.12 K/UL (ref 0–0.8)
MONOCYTES NFR BLD AUTO: 11.4 % (ref 2–6)
MPC BLD CALC-MCNC: 12.7 FL (ref 9.4–12.4)
NEUTROPHILS # BLD AUTO: 4.24 K/UL (ref 1.8–7.7)
NEUTROPHILS NFR BLD AUTO: 43.3 % (ref 53–65)
NONHDLC SERPL-MCNC: 76 MG/DL
NRBC # BLD AUTO: 0 X10E3/UL
NRBC, AUTO (.00): 0 %
PLATELET # BLD AUTO: 209 K/UL (ref 150–400)
POTASSIUM SERPL-SCNC: 3.3 MMOL/L (ref 3.5–5.1)
PROT SERPL-MCNC: 8.4 G/DL (ref 6.4–8.2)
RBC # BLD AUTO: 4.84 M/UL (ref 4.6–6.2)
SODIUM SERPL-SCNC: 142 MMOL/L (ref 136–145)
TRIGL SERPL-MCNC: 47 MG/DL (ref 35–150)
VLDLC SERPL-MCNC: 9 MG/DL
WBC # BLD AUTO: 9.79 K/UL (ref 4.5–11)

## 2024-03-20 PROCEDURE — 80061 LIPID PANEL: CPT | Mod: ,,, | Performed by: CLINICAL MEDICAL LABORATORY

## 2024-03-20 PROCEDURE — 82306 VITAMIN D 25 HYDROXY: CPT | Mod: ,,, | Performed by: CLINICAL MEDICAL LABORATORY

## 2024-03-20 PROCEDURE — 85025 COMPLETE CBC W/AUTO DIFF WBC: CPT | Mod: ,,, | Performed by: CLINICAL MEDICAL LABORATORY

## 2024-03-20 PROCEDURE — 83036 HEMOGLOBIN GLYCOSYLATED A1C: CPT | Mod: ,,, | Performed by: CLINICAL MEDICAL LABORATORY

## 2024-03-20 PROCEDURE — 80053 COMPREHEN METABOLIC PANEL: CPT | Mod: ,,, | Performed by: CLINICAL MEDICAL LABORATORY

## 2024-03-20 PROCEDURE — 83970 ASSAY OF PARATHORMONE: CPT | Mod: ,,, | Performed by: CLINICAL MEDICAL LABORATORY

## 2024-03-20 PROCEDURE — 80053 COMPREHEN METABOLIC PANEL: CPT | Mod: XE,,, | Performed by: CLINICAL MEDICAL LABORATORY

## 2024-03-20 RX ORDER — SILDENAFIL 100 MG/1
100 TABLET, FILM COATED ORAL DAILY PRN
Qty: 15 TABLET | Refills: 3 | Status: SHIPPED | OUTPATIENT
Start: 2024-03-20 | End: 2025-03-20

## 2024-03-20 RX ORDER — KETOCONAZOLE 200 MG/1
200 TABLET ORAL DAILY
Qty: 10 TABLET | Refills: 1 | Status: SHIPPED | OUTPATIENT
Start: 2024-03-20 | End: 2024-06-20 | Stop reason: ALTCHOICE

## 2024-03-20 RX ORDER — KETOCONAZOLE 20 MG/G
CREAM TOPICAL DAILY
Qty: 120 G | Refills: 2 | Status: SHIPPED | OUTPATIENT
Start: 2024-03-20

## 2024-03-20 NOTE — Clinical Note
Shingles Vaccine(1 of 2) declined Influenza Vaccine(1) declined COVID-19 Vaccine(3 - 2023-24 season) declined  Hemoglobin A1c (Prediabetes) ordered

## 2024-03-21 LAB
EST. AVERAGE GLUCOSE BLD GHB EST-MCNC: 131 MG/DL
HBA1C MFR BLD HPLC: 6.2 % (ref 4.5–6.6)

## 2024-03-26 ENCOUNTER — TELEPHONE (OUTPATIENT)
Dept: FAMILY MEDICINE | Facility: CLINIC | Age: 59
End: 2024-03-26
Payer: COMMERCIAL

## 2024-03-26 DIAGNOSIS — I10 PRIMARY HYPERTENSION: Primary | ICD-10-CM

## 2024-03-26 DIAGNOSIS — K27.9 PUD (PEPTIC ULCER DISEASE): ICD-10-CM

## 2024-03-26 LAB
25(OH)D3 SERPL-MCNC: 8.4 NG/ML
ALBUMIN SERPL BCP-MCNC: 4.3 G/DL (ref 3.5–5)
ALBUMIN/GLOB SERPL: 1 {RATIO}
ALP SERPL-CCNC: 73 U/L (ref 45–115)
ALT SERPL W P-5'-P-CCNC: 35 U/L (ref 16–61)
ANION GAP SERPL CALCULATED.3IONS-SCNC: 13 MMOL/L (ref 7–16)
AST SERPL W P-5'-P-CCNC: 24 U/L (ref 15–37)
BILIRUB SERPL-MCNC: 0.3 MG/DL (ref ?–1.2)
BUN SERPL-MCNC: 14 MG/DL (ref 7–18)
BUN/CREAT SERPL: 12 (ref 6–20)
CALCIUM SERPL-MCNC: 10.5 MG/DL (ref 8.5–10.1)
CHLORIDE SERPL-SCNC: 106 MMOL/L (ref 98–107)
CO2 SERPL-SCNC: 25 MMOL/L (ref 21–32)
CREAT SERPL-MCNC: 1.2 MG/DL (ref 0.7–1.3)
EGFR (NO RACE VARIABLE) (RUSH/TITUS): 70 ML/MIN/1.73M2
GLOBULIN SER-MCNC: 4.3 G/DL (ref 2–4)
GLUCOSE SERPL-MCNC: 72 MG/DL (ref 74–106)
POTASSIUM SERPL-SCNC: 4.5 MMOL/L (ref 3.5–5.1)
PROT SERPL-MCNC: 8.6 G/DL (ref 6.4–8.2)
PTH-INTACT SERPL-MCNC: 28.4 PG/ML (ref 18.4–80.1)
SODIUM SERPL-SCNC: 139 MMOL/L (ref 136–145)

## 2024-03-26 NOTE — TELEPHONE ENCOUNTER
----- Message from Zen Pacheco DO sent at 3/25/2024  1:11 PM CDT -----  Needs a PTH level and vit D level   Alert-The patient is alert, awake and responds to voice. The patient is oriented to time, place, and person. The triage nurse is able to obtain subjective information.

## 2024-03-28 DIAGNOSIS — E55.9 VITAMIN D DEFICIENCY: Primary | ICD-10-CM

## 2024-03-28 RX ORDER — ERGOCALCIFEROL 1.25 MG/1
50000 CAPSULE ORAL
Qty: 12 CAPSULE | Refills: 2 | Status: SHIPPED | OUTPATIENT
Start: 2024-03-28

## 2024-03-28 NOTE — TELEPHONE ENCOUNTER
----- Message from Zen Pacheco DO sent at 3/28/2024 10:12 AM CDT -----  Needs PTH level and vit d [id number] units weekly

## 2024-04-06 DIAGNOSIS — M19.90 ARTHRITIS: ICD-10-CM

## 2024-04-08 RX ORDER — MELOXICAM 15 MG/1
15 TABLET ORAL
Qty: 90 TABLET | Refills: 0 | Status: SHIPPED | OUTPATIENT
Start: 2024-04-08

## 2024-06-20 ENCOUNTER — OFFICE VISIT (OUTPATIENT)
Dept: FAMILY MEDICINE | Facility: CLINIC | Age: 59
End: 2024-06-20
Payer: COMMERCIAL

## 2024-06-20 VITALS
DIASTOLIC BLOOD PRESSURE: 85 MMHG | TEMPERATURE: 98 F | RESPIRATION RATE: 18 BRPM | WEIGHT: 223 LBS | BODY MASS INDEX: 30.2 KG/M2 | HEIGHT: 72 IN | HEART RATE: 52 BPM | SYSTOLIC BLOOD PRESSURE: 142 MMHG | OXYGEN SATURATION: 98 %

## 2024-06-20 DIAGNOSIS — E83.52 HYPERCALCEMIA: ICD-10-CM

## 2024-06-20 DIAGNOSIS — R00.1 BRADYCARDIA: ICD-10-CM

## 2024-06-20 DIAGNOSIS — E55.9 VITAMIN D DEFICIENCY: ICD-10-CM

## 2024-06-20 DIAGNOSIS — R73.03 PREDIABETES: ICD-10-CM

## 2024-06-20 DIAGNOSIS — M16.0 PRIMARY OSTEOARTHRITIS OF BOTH HIPS: ICD-10-CM

## 2024-06-20 DIAGNOSIS — E87.6 HYPOKALEMIA: ICD-10-CM

## 2024-06-20 DIAGNOSIS — K27.9 PUD (PEPTIC ULCER DISEASE): ICD-10-CM

## 2024-06-20 DIAGNOSIS — Z12.5 SCREENING FOR PROSTATE CANCER: ICD-10-CM

## 2024-06-20 DIAGNOSIS — I10 PRIMARY HYPERTENSION: Primary | ICD-10-CM

## 2024-06-20 LAB
25(OH)D3 SERPL-MCNC: 34.6 NG/ML
ANION GAP SERPL CALCULATED.3IONS-SCNC: 13 MMOL/L (ref 7–16)
BUN SERPL-MCNC: 9 MG/DL (ref 7–18)
BUN/CREAT SERPL: 8 (ref 6–20)
CALCIUM SERPL-MCNC: 9.9 MG/DL (ref 8.5–10.1)
CHLORIDE SERPL-SCNC: 106 MMOL/L (ref 98–107)
CO2 SERPL-SCNC: 25 MMOL/L (ref 21–32)
CREAT SERPL-MCNC: 1.08 MG/DL (ref 0.7–1.3)
CREAT UR-MCNC: 488 MG/DL (ref 39–259)
EGFR (NO RACE VARIABLE) (RUSH/TITUS): 80 ML/MIN/1.73M2
EST. AVERAGE GLUCOSE BLD GHB EST-MCNC: 123 MG/DL
GLUCOSE SERPL-MCNC: 99 MG/DL (ref 74–106)
HBA1C MFR BLD HPLC: 5.9 % (ref 4.5–6.6)
MICROALBUMIN UR-MCNC: 3.9 MG/DL (ref 0–2.8)
MICROALBUMIN/CREAT RATIO PNL UR: 8 MG/G (ref 0–30)
POTASSIUM SERPL-SCNC: 3.2 MMOL/L (ref 3.5–5.1)
PSA SERPL-MCNC: 0.88 NG/ML
SODIUM SERPL-SCNC: 141 MMOL/L (ref 136–145)
TSH SERPL DL<=0.005 MIU/L-ACNC: 1.15 UIU/ML (ref 0.36–3.74)

## 2024-06-20 PROCEDURE — 82306 VITAMIN D 25 HYDROXY: CPT | Mod: ,,, | Performed by: CLINICAL MEDICAL LABORATORY

## 2024-06-20 PROCEDURE — G0103 PSA SCREENING: HCPCS | Mod: ,,, | Performed by: CLINICAL MEDICAL LABORATORY

## 2024-06-20 PROCEDURE — 84443 ASSAY THYROID STIM HORMONE: CPT | Mod: ,,, | Performed by: CLINICAL MEDICAL LABORATORY

## 2024-06-20 PROCEDURE — 82570 ASSAY OF URINE CREATININE: CPT | Mod: ,,, | Performed by: CLINICAL MEDICAL LABORATORY

## 2024-06-20 PROCEDURE — 82043 UR ALBUMIN QUANTITATIVE: CPT | Mod: ,,, | Performed by: CLINICAL MEDICAL LABORATORY

## 2024-06-20 PROCEDURE — 83036 HEMOGLOBIN GLYCOSYLATED A1C: CPT | Mod: ,,, | Performed by: CLINICAL MEDICAL LABORATORY

## 2024-06-20 PROCEDURE — 80048 BASIC METABOLIC PNL TOTAL CA: CPT | Mod: ,,, | Performed by: CLINICAL MEDICAL LABORATORY

## 2024-06-20 RX ORDER — AMLODIPINE BESYLATE 10 MG/1
10 TABLET ORAL NIGHTLY
Qty: 90 TABLET | Refills: 1 | Status: SHIPPED | OUTPATIENT
Start: 2024-06-20

## 2024-06-20 RX ORDER — PANTOPRAZOLE SODIUM 40 MG/1
40 TABLET, DELAYED RELEASE ORAL DAILY
Qty: 90 TABLET | Refills: 1 | Status: SHIPPED | OUTPATIENT
Start: 2024-06-20

## 2024-06-20 RX ORDER — MELOXICAM 15 MG/1
15 TABLET ORAL DAILY
Qty: 90 TABLET | Refills: 1 | Status: SHIPPED | OUTPATIENT
Start: 2024-06-20

## 2024-06-20 NOTE — PROGRESS NOTES
"             [facility]    Merlyn Bonner, AGPCNP-BC [facility]  [address]  [place] [address]  [phone]          PATIENT NAME: Maranda Clark  : 1965  DATE: 24  MRN: [id number]          Reason for Visit        Chief Complaint   Patient presents with    Hypertension     A three month follow up    Hip Pain     He states that he is still having pain rates a 6/10 , states that he takes otc meds as well to alleviate this pain    Health Maintenance     Shingles Vaccine(1 of 2) Never done-dec  COVID-19 Vaccine(3 - 2023-24 season) due on 2023-dec         History of Present Illness      Maranda Clark is a 58 y.o. male with chronic conditions of GERD, Osteoarthritis Bilateral Hips R > L, Hypertension, and Obesity  who presents today for 3 month follow up. /91 HR 50, EKG performed today in clinic. Repeat /85 HR 52. He reports never having an EKG prior to today. He reports doing more exercise of late and reports his right hip "let me know it is still there". He takes Meloxicam daily but doesn't know that it helps a lot, he did see orthopedics in February for same complaint. He reports that he also takes OTC Tylenol. We discussed his blood pressure, EKG, and heart rate and opted to refer to cardiology for further work up.     He works at [employer] for 15-16 years. He is a  since 2020. He has 3 daughters. He has 6 grandchildren.     Care Team:  PCP Dr. Zen Pacheco  Optometry Dr. Gerry Chua  Orthopedics Arthur Cunningham NP  Cardiologist Dr. Torey Marroquin (initial appointment 07/10/2024)    MEDICAL / SURGICAL / SOCIAL HISTORY     Past Medical History:   Diagnosis Date    GERD (gastroesophageal reflux disease)     Hypertension     Osteoarthritis, hip, bilateral     right greather than left       Past Surgical History:   Procedure Laterality Date    CHOLECYSTECTOMY  2016       Social History     Tobacco Use "    Smoking status: Never     Passive exposure: Never    Smokeless tobacco: Never   Substance Use Topics    Alcohol use: Yes     Alcohol/week: 6.0 standard drinks of alcohol     Types: 6 Cans of beer per week    Drug use: Never         I personally reviewed all past medical, surgical, and social.     Review of Systems   Constitutional:  Negative for chills and fever.   HENT:  Negative for congestion, hearing loss, rhinorrhea and sore throat.    Eyes:  Negative for visual disturbance.   Respiratory:  Negative for cough and shortness of breath.    Cardiovascular:  Negative for chest pain.   Gastrointestinal:  Negative for abdominal pain, constipation and diarrhea.   Genitourinary:  Negative for dysuria, hematuria and urgency.   Musculoskeletal:  Positive for arthralgias (bilateral hips). Negative for back pain, myalgias and neck pain.   Neurological:  Negative for dizziness and headaches.   Psychiatric/Behavioral:  Negative for dysphoric mood and sleep disturbance. The patient is not nervous/anxious.         MEDICATIONS / ALLERGIES / HM     Current Outpatient Medications   Medication Sig Dispense Refill    ergocalciferol (ERGOCALCIFEROL) 50,000 unit Cap Take 1 capsule (50,000 Units total) by mouth every 7 days. 12 capsule 2    ketoconazole (NIZORAL) 2 % cream Apply topically once daily. 120 g 2    metoprolol succinate (TOPROL-XL) 200 MG 24 hr tablet Take 2 tablets (400 mg total) by mouth once daily. 180 tablet 1    rosuvastatin (CRESTOR) 10 MG tablet Take 1 tablet (10 mg total) by mouth once daily. 90 tablet 3    sildenafiL (VIAGRA) 100 MG tablet Take 1 tablet (100 mg total) by mouth daily as needed for Erectile Dysfunction. 15 tablet 3    amLODIPine (NORVASC) 10 MG tablet Take 1 tablet (10 mg total) by mouth every evening. 90 tablet 1    meloxicam (MOBIC) 15 MG tablet Take 1 tablet (15 mg total) by mouth once daily. 90 tablet 1    pantoprazole (PROTONIX) 40 MG tablet Take 1 tablet (40 mg total) by mouth once daily. 90  tablet 1     No current facility-administered medications for this visit.       Review of patient's allergies indicates:  No Known Allergies    Immunization History   Administered Date(s) Administered    COVID-19, MRNA, LN-S, PF (Pfizer) (Purple Cap) 08/29/2021, 09/19/2021    Tdap 01/27/2022        Health Maintenance   Topic Date Due    Shingles Vaccine (1 of 2) Never done-done     Colorectal Cancer Screening  04/11/2026    Lipid Panel  03/20/2029    TETANUS VACCINE  01/27/2032    Hepatitis C Screening  Completed        Physical Exam      Vital Signs  Temp: 98.1 °F (36.7 °C)  Temp Source: Oral  Pulse: (!) 52  Resp: 18  SpO2: 98 %  BP: (!) 142/85  BP Location: Left arm  Patient Position: Sitting  Pain Score: 0-No pain  Height and Weight  Height: 6' (182.9 cm)  Weight: 101.2 kg (223 lb)  BSA (Calculated - sq m): 2.27 sq meters  BMI (Calculated): 30.2  Weight in (lb) to have BMI = 25: 183.9]    Physical Exam  Constitutional:       General: He is not in acute distress.  HENT:      Head: Normocephalic.      Right Ear: External ear normal.      Left Ear: External ear normal.   Cardiovascular:      Rate and Rhythm: Normal rate and regular rhythm.      Pulses: Normal pulses.      Heart sounds: Normal heart sounds.   Pulmonary:      Effort: Pulmonary effort is normal.      Breath sounds: Normal breath sounds.   Abdominal:      Palpations: Abdomen is soft.      Tenderness: There is no abdominal tenderness.   Skin:     General: Skin is warm and dry.   Neurological:      Mental Status: He is alert and oriented to person, place, and time.   Psychiatric:         Mood and Affect: Mood normal.         Behavior: Behavior normal.          Laboratory:    Lab Results   Component Value Date    GLU 93 03/20/2024    GLU 72 (L) 03/20/2024     03/20/2024     03/20/2024    K 3.3 (L) 03/20/2024    K 4.5 03/20/2024     (H) 03/20/2024     03/20/2024    CO2 28 03/20/2024    CO2 25 03/20/2024    BUN 13 03/20/2024    BUN 14  "03/20/2024    CREATININE 1.17 03/20/2024    CREATININE 1.20 03/20/2024    CALCIUM 10.3 (H) 03/20/2024    CALCIUM 10.5 (H) 03/20/2024    PROT 8.4 (H) 03/20/2024    PROT 8.6 (H) 03/20/2024    ALBUMIN 4.4 03/20/2024    ALBUMIN 4.3 03/20/2024    BILITOT 0.3 03/20/2024    BILITOT 0.3 03/20/2024    ALKPHOS 80 03/20/2024    ALKPHOS 73 03/20/2024    AST 21 03/20/2024    AST 24 03/20/2024    ALT 36 03/20/2024    ALT 35 03/20/2024    ANIONGAP 9 03/20/2024    ANIONGAP 13 03/20/2024    ESTGFRAFRICA 77 10/28/2020    EGFRNONAA 77 06/27/2022       Lab Results   Component Value Date    WBC 9.79 03/20/2024    RBC 4.84 03/20/2024    HGB 14.7 03/20/2024    HCT 42.8 03/20/2024    MCV 88.4 03/20/2024    RDW 13.2 03/20/2024     03/20/2024        Lab Results   Component Value Date    CHOL 135 03/20/2024    TRIG 47 03/20/2024    HDL 59 03/20/2024    LDLCALC 67 03/20/2024       Lab Results   Component Value Date    TSH 2.470 01/09/2023       Lab Results   Component Value Date    HGBA1C 6.2 03/20/2024    ESTIMATEDAVG 131 03/20/2024        No results found for: "[id number]"    Lab Results   Component Value Date    BOHCCFWB97YA 8.4 03/20/2024       Lab Results   Component Value Date    PSA 0.785 10/28/2020         Point Of Care Testing:    No results found for: "WBCUR", "NITRITE", "UROBILINOGEN", "PROTEINPOC", "PHUR", "BLOODUR", "SPECGRAV", "KETONESU", "BILIRUBINPOC", "GLUCOSEUR"    No results found for: "[id number]", "FLUAMOLEC", "FLUBMOLEC", "MOLSTREPAPOC"          Assessment/Plan     Primary hypertension  -     initial BP repeat /91 HR 50 142/85 HR 52  -     he reports still taking Metoprolol Succ 200 mg two tablets twice daily (this was last filled 10/06/2023 #180 with 1 refill should have been out in April 2024, but he reports still taking this and did not need refill) Instructed to take all medications bottles to all future appointments with any provider   -     amLODIPine (NORVASC) 10 MG tablet; Take 1 tablet (10 " mg total) by mouth every evening.  Dispense: 90 tablet; Refill: 1  -     01/09/2023 TSH 2.470 (0.358-3.740)  -     TSH; Future; Expected date: 06/20/2024  -     Ambulatory referral/consult to Cardiology; Future; Expected date: 06/27/2024  -     Microalbumin/Creatinine Ratio, Urine; Future; Expected date: 06/20/2024    Bradycardia  -     POCT EKG 12-LEAD (Manually Resulted by Ordering Provider)  -     Ambulatory referral/consult to Cardiology; Future; Expected date: 06/27/2024    Primary osteoarthritis of both hips  -     meloxicam (MOBIC) 15 MG tablet; Take 1 tablet (15 mg total) by mouth once daily.  Dispense: 90 tablet; Refill: 1  -     followed by orthopedics  -     take OTC Tylenol as needed in addition to Meloxicam instructed to not take Aleve and/or Ibuprofen with Meloxicam     PUD (peptic ulcer disease)  -     pantoprazole (PROTONIX) 40 MG tablet; Take 1 tablet (40 mg total) by mouth once daily.  Dispense: 90 tablet; Refill: 1    Hypercalcemia  -     03/20/2024 Ca++ 10.2 (8.5-10.1)  -     repeat 03/26/2024 Ca++ 10.5 (8.5-10.1)  -     Basic Metabolic Panel; Future; Expected date: 06/20/2024    Hypokalemia  -     03/20/2024 K+ 3.3 (3.5-5.1)  -     Basic Metabolic Panel; Future; Expected date: 06/20/2024    Vitamin D deficiency  -     03/20/2024 Vitamin D 8.4 ()  -     on high dose weekly Vitamin D  -     Vitamin D; Future; Expected date: 06/20/2024    Prediabetes  -     03/20/2024 Hemoglobin A1c 6.2%  -     Hemoglobin A1C; Future; Expected date: 06/20/2024  -     Microalbumin/Creatinine Ratio, Urine; Future; Expected date: 06/20/2024    Screening for prostate cancer  -     10/28/2020 PSA 0.785 (,= 4.000)  -     PSA, Screening; Future; Expected date: 06/20/2024        Future Appointments   Date Time Provider Department Center   7/10/2024  3:00 PM Torey Marroquin DO [facility]   9/20/2024  1:40 PM Zen aPcheco, DO [facility]   3/24/2025  3:00 PM Zen Pacheco, DO [facility]  [facility]       Workup results were reviewed and all questions were answered. Diagnosis and treatment options were discussed and the patient  is amenable with the overall treatment plan. Verbal and written discharge instructions were given including to return to clinic/ED with any acute worsening of symptoms or failure of symptoms to improve. The reasons for return to the clinic/ED were explained in lay terms. No further intervention is warranted at this time. The patient agrees with the plan, expresses understanding, is hemodynamically stable and in no acute distress.     All questions answered to desired level of satisfaction          COURTNEY Zamora-BC [facility]

## 2024-07-10 ENCOUNTER — OFFICE VISIT (OUTPATIENT)
Dept: CARDIOLOGY | Facility: CLINIC | Age: 59
End: 2024-07-10
Payer: COMMERCIAL

## 2024-07-10 VITALS
DIASTOLIC BLOOD PRESSURE: 88 MMHG | HEIGHT: 72 IN | BODY MASS INDEX: 28.04 KG/M2 | SYSTOLIC BLOOD PRESSURE: 120 MMHG | WEIGHT: 207 LBS | OXYGEN SATURATION: 97 % | HEART RATE: 71 BPM

## 2024-07-10 DIAGNOSIS — I10 PRIMARY HYPERTENSION: ICD-10-CM

## 2024-07-10 DIAGNOSIS — I10 HYPERTENSION, UNSPECIFIED TYPE: Primary | ICD-10-CM

## 2024-07-10 DIAGNOSIS — R06.83 SNORING: ICD-10-CM

## 2024-07-10 DIAGNOSIS — E78.2 MIXED HYPERLIPIDEMIA: ICD-10-CM

## 2024-07-10 DIAGNOSIS — R00.1 SINUS BRADYCARDIA BY ELECTROCARDIOGRAM: ICD-10-CM

## 2024-07-10 DIAGNOSIS — I10 PRIMARY HYPERTENSION: Primary | ICD-10-CM

## 2024-07-10 DIAGNOSIS — R00.1 BRADYCARDIA: ICD-10-CM

## 2024-07-10 PROCEDURE — 3074F SYST BP LT 130 MM HG: CPT | Mod: CPTII,,, | Performed by: INTERNAL MEDICINE

## 2024-07-10 PROCEDURE — 99205 OFFICE O/P NEW HI 60 MIN: CPT | Mod: S$PBB,,, | Performed by: INTERNAL MEDICINE

## 2024-07-10 PROCEDURE — 3061F NEG MICROALBUMINURIA REV: CPT | Mod: CPTII,,, | Performed by: INTERNAL MEDICINE

## 2024-07-10 PROCEDURE — 3079F DIAST BP 80-89 MM HG: CPT | Mod: CPTII,,, | Performed by: INTERNAL MEDICINE

## 2024-07-10 PROCEDURE — 4010F ACE/ARB THERAPY RXD/TAKEN: CPT | Mod: CPTII,,, | Performed by: INTERNAL MEDICINE

## 2024-07-10 PROCEDURE — 99999 PR PBB SHADOW E&M-EST. PATIENT-LVL V: CPT | Mod: PBBFAC,,, | Performed by: INTERNAL MEDICINE

## 2024-07-10 PROCEDURE — 93005 ELECTROCARDIOGRAM TRACING: CPT | Mod: PBBFAC | Performed by: INTERNAL MEDICINE

## 2024-07-10 PROCEDURE — 99215 OFFICE O/P EST HI 40 MIN: CPT | Mod: PBBFAC | Performed by: INTERNAL MEDICINE

## 2024-07-10 PROCEDURE — 3066F NEPHROPATHY DOC TX: CPT | Mod: CPTII,,, | Performed by: INTERNAL MEDICINE

## 2024-07-10 PROCEDURE — 3044F HG A1C LEVEL LT 7.0%: CPT | Mod: CPTII,,, | Performed by: INTERNAL MEDICINE

## 2024-07-10 PROCEDURE — 93010 ELECTROCARDIOGRAM REPORT: CPT | Mod: S$PBB,,, | Performed by: INTERNAL MEDICINE

## 2024-07-10 PROCEDURE — 3008F BODY MASS INDEX DOCD: CPT | Mod: CPTII,,, | Performed by: INTERNAL MEDICINE

## 2024-07-10 RX ORDER — METOPROLOL SUCCINATE 25 MG/1
100 TABLET, EXTENDED RELEASE ORAL 2 TIMES DAILY
Qty: 720 TABLET | Refills: 3 | Status: SHIPPED | OUTPATIENT
Start: 2024-07-10 | End: 2024-07-11

## 2024-07-10 RX ORDER — LOSARTAN POTASSIUM 25 MG/1
25 TABLET ORAL DAILY
Qty: 90 TABLET | Refills: 3 | Status: SHIPPED | OUTPATIENT
Start: 2024-07-10 | End: 2025-07-10

## 2024-07-10 NOTE — ASSESSMENT & PLAN NOTE
History of storing in setting on hypertension. Denies any daytime sleepiness, excessive fatigue, or poor concentration. Will refer to sleep medicine.

## 2024-07-10 NOTE — ASSESSMENT & PLAN NOTE
Blood pressure in 120/80s with sinus bradycardia. Switch metoprolol from 200 to 100 mg bid, and start cozaar 25 mg daily. Continue norvasc 10 mg daily. Will refer to sleep medicine to evaluate for [place].

## 2024-07-10 NOTE — ASSESSMENT & PLAN NOTE
Referred by pcp for sinus bradycardia per ekg. Denies any chest pain, sob, LE edema, arrhythmia, lightheadedness, or syncopal episodes. Currently, on 200 mg of metoprolol plus norvasc 10 mg. Renal function normal. Will reduce metoprolol from 200 to 100 mg bid. Add cozaar 25 mg daily and continue norvasc 10 mg daily. Obtain echo to evaluate for structural abnormalities.

## 2024-07-10 NOTE — PROGRESS NOTES
PCP: Zen Pacheco DO    Referring Provider:     Subjective:   Maranda Clark is a 58 y.o. male with hx of hypertension, mixed hyperlipidemia, who presents for bradycardia. Denies any chest pain, sob, lightheadedness, blurry vision, or dizziness. 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 Family history of hypertension and MI. Recently seen by pcp. Noted to be bradycardic to 50 with an elevated bp.  No prior personal history of cardiac disease including arrhythmias, myocardial ischemia/infarction.       Fhx:   Family History   Problem Relation Name Age of Onset    Hypertension Mother      Heart attack Mother      Hypertension Sister      Diabetes Sister      Hypertension Sister      Hypertension Sister      Hypertension Daughter      Hypertension Daughter      Hypertension Daughter        Shx:   Past Surgical History:   Procedure Laterality Date    CHOLECYSTECTOMY  2016        EKG - No results found for this or any previous visit.     ECHO - No results found for this or any previous visit.       CATH - No results found for this or any previous visit.       Stress - No results found for this or any previous visit.       Lab Results   Component Value Date     06/20/2024    K 3.2 (L) 06/20/2024     06/20/2024    CO2 25 06/20/2024    BUN 9 06/20/2024    CREATININE 1.08 06/20/2024    CALCIUM 9.9 06/20/2024    ANIONGAP 13 06/20/2024    ESTGFRAFRICA 77 10/28/2020    EGFRNONAA 77 06/27/2022       Lab Results   Component Value Date    CHOL 135 03/20/2024    CHOL 152 02/13/2023    CHOL 186 01/09/2023     Lab Results   Component Value Date    HDL 59 03/20/2024    HDL 54 02/13/2023    HDL 51 01/09/2023     Lab Results   Component Value Date    LDLCALC 67 03/20/2024    LDLCALC 88 02/13/2023    LDLCALC 124 01/09/2023     Lab Results   Component Value Date    TRIG 47 03/20/2024    TRIG 52 02/13/2023    TRIG 56 01/09/2023     Lab Results   Component Value Date    CHOLHDL 2.3 03/20/2024    CHOLHDL 2.8  02/13/2023    CHOLHDL 3.6 01/09/2023       Lab Results   Component Value Date    WBC 9.79 03/20/2024    HGB 14.7 03/20/2024    HCT 42.8 03/20/2024    MCV 88.4 03/20/2024     03/20/2024           Current Outpatient Medications:     amLODIPine (NORVASC) 10 MG tablet, Take 1 tablet (10 mg total) by mouth every evening., Disp: 90 tablet, Rfl: 1    ergocalciferol (ERGOCALCIFEROL) 50,000 unit Cap, Take 1 capsule (50,000 Units total) by mouth every 7 days., Disp: 12 capsule, Rfl: 2    ketoconazole (NIZORAL) 2 % cream, Apply topically once daily., Disp: 120 g, Rfl: 2    meloxicam (MOBIC) 15 MG tablet, Take 1 tablet (15 mg total) by mouth once daily., Disp: 90 tablet, Rfl: 1    metoprolol succinate (TOPROL-XL) 200 MG 24 hr tablet, Take 2 tablets (400 mg total) by mouth once daily., Disp: 180 tablet, Rfl: 1    pantoprazole (PROTONIX) 40 MG tablet, Take 1 tablet (40 mg total) by mouth once daily., Disp: 90 tablet, Rfl: 1    rosuvastatin (CRESTOR) 10 MG tablet, Take 1 tablet (10 mg total) by mouth once daily., Disp: 90 tablet, Rfl: 3    sildenafiL (VIAGRA) 100 MG tablet, Take 1 tablet (100 mg total) by mouth daily as needed for Erectile Dysfunction., Disp: 15 tablet, Rfl: 3    Review of Systems   Constitutional: Negative for malaise/fatigue and weight gain.   Eyes:  Negative for blurred vision.   Cardiovascular:  Negative for chest pain, irregular heartbeat, leg swelling, near-syncope, orthopnea and paroxysmal nocturnal dyspnea.   Respiratory:  Positive for snoring.    Gastrointestinal:  Negative for abdominal pain.        Occasional nause          Objective:   /88 (BP Location: Right arm, Patient Position: Sitting)   Pulse 71   Ht 6' (1.829 m)   Wt 93.9 kg (207 lb)   SpO2 97%   BMI 28.07 kg/m²       Physical Exam  Vitals reviewed.   Constitutional:       Appearance: Normal appearance.   Neck:      Vascular: No carotid bruit.   Cardiovascular:      Rate and Rhythm: Regular rhythm. Bradycardia present.       Pulses: Normal pulses.      Heart sounds: No murmur heard.     No friction rub. No gallop.   Pulmonary:      Effort: Pulmonary effort is normal.      Breath sounds: Normal breath sounds. No wheezing, rhonchi or rales.   Musculoskeletal:      Cervical back: Neck supple.   Skin:     General: Skin is warm and dry.      Capillary Refill: Capillary refill takes less than 2 seconds.   Neurological:      Mental Status: He is alert.         Assessment:     1. Primary hypertension  Ambulatory referral/consult to Cardiology    EKG 12-lead    EKG 12-lead    losartan (COZAAR) 25 MG tablet    metoprolol succinate (TOPROL-XL) 25 MG 24 hr tablet    Echo    Ambulatory referral/consult to Sleep Disorders      2. Bradycardia  Ambulatory referral/consult to Cardiology    Echo      3. Mixed hyperlipidemia        4. Snoring        5. Sinus bradycardia by electrocardiogram              Plan:   Hypertension  Blood pressure in 120/80s with sinus bradycardia. Switch metoprolol from 200 to 100 mg bid, and start cozaar 25 mg daily. Continue norvasc 10 mg daily. Will refer to sleep medicine to evaluate for [place].    Mixed hyperlipidemia  Continue crestor 10 mg daily.    Snoring  History of storing in setting on hypertension. Denies any daytime sleepiness, excessive fatigue, or poor concentration. Will refer to sleep medicine.     Sinus bradycardia by electrocardiogram  Referred by pcp for sinus bradycardia per ekg. Denies any chest pain, sob, LE edema, arrhythmia, lightheadedness, or syncopal episodes. Currently, on 200 mg of metoprolol plus norvasc 10 mg. Renal function normal. Will reduce metoprolol from 200 to 100 mg bid. Add cozaar 25 mg daily and continue norvasc 10 mg daily. Obtain echo to evaluate for structural abnormalities.

## 2024-07-10 NOTE — PATIENT INSTRUCTIONS
Return for echocardiogram to evaluate heart.   2. Stop taking metoprolol 200 mg twice daily.   3. Start taking cozaar 25 mg daily  4. Start taking metoprolol 100 mg twice daily  5. Continue taking norvasc 10 mg daily  6. Referral to sleep medicine to evaluate for obstructive sleep apnea ([place])

## 2024-07-11 RX ORDER — METOPROLOL SUCCINATE 25 MG/1
100 TABLET, EXTENDED RELEASE ORAL 2 TIMES DAILY
Qty: 720 TABLET | Refills: 3 | OUTPATIENT
Start: 2024-07-11

## 2024-07-11 RX ORDER — METOPROLOL SUCCINATE 200 MG/1
200 TABLET, EXTENDED RELEASE ORAL DAILY
Qty: 90 TABLET | Refills: 1 | Status: SHIPPED | OUTPATIENT
Start: 2024-07-11

## 2024-07-11 RX ORDER — METOPROLOL SUCCINATE 200 MG/1
200 TABLET, EXTENDED RELEASE ORAL DAILY
COMMUNITY
End: 2024-07-11 | Stop reason: SDUPTHER

## 2024-07-11 NOTE — TELEPHONE ENCOUNTER
Pt was previously sent in 25mg and to take 4 tabs bid which made it 8 tabs a day. Insurance will not pay for that, not sure who did the refill like that. Pt needs 200 mg daily sent in.

## 2024-07-12 LAB
OHS QRS DURATION: 86 MS
OHS QTC CALCULATION: 423 MS

## 2024-09-23 ENCOUNTER — OFFICE VISIT (OUTPATIENT)
Dept: FAMILY MEDICINE | Facility: CLINIC | Age: 59
End: 2024-09-23
Payer: COMMERCIAL

## 2024-09-23 VITALS
BODY MASS INDEX: 30.53 KG/M2 | TEMPERATURE: 99 F | SYSTOLIC BLOOD PRESSURE: 133 MMHG | RESPIRATION RATE: 16 BRPM | HEART RATE: 60 BPM | DIASTOLIC BLOOD PRESSURE: 88 MMHG | WEIGHT: 225.38 LBS | OXYGEN SATURATION: 98 % | HEIGHT: 72 IN

## 2024-09-23 DIAGNOSIS — E87.6 HYPOKALEMIA: Primary | ICD-10-CM

## 2024-09-23 DIAGNOSIS — M16.0 PRIMARY OSTEOARTHRITIS OF BOTH HIPS: ICD-10-CM

## 2024-09-23 DIAGNOSIS — I10 PRIMARY HYPERTENSION: ICD-10-CM

## 2024-09-23 DIAGNOSIS — E78.2 MIXED HYPERLIPIDEMIA: ICD-10-CM

## 2024-09-23 DIAGNOSIS — K27.9 PUD (PEPTIC ULCER DISEASE): ICD-10-CM

## 2024-09-23 LAB — POTASSIUM SERPL-SCNC: 3.3 MMOL/L (ref 3.5–5.1)

## 2024-09-23 PROCEDURE — 3044F HG A1C LEVEL LT 7.0%: CPT | Mod: CPTII,,, | Performed by: NURSE PRACTITIONER

## 2024-09-23 PROCEDURE — 1159F MED LIST DOCD IN RCRD: CPT | Mod: CPTII,,, | Performed by: NURSE PRACTITIONER

## 2024-09-23 PROCEDURE — 3075F SYST BP GE 130 - 139MM HG: CPT | Mod: CPTII,,, | Performed by: NURSE PRACTITIONER

## 2024-09-23 PROCEDURE — 1160F RVW MEDS BY RX/DR IN RCRD: CPT | Mod: CPTII,,, | Performed by: NURSE PRACTITIONER

## 2024-09-23 PROCEDURE — 3061F NEG MICROALBUMINURIA REV: CPT | Mod: CPTII,,, | Performed by: NURSE PRACTITIONER

## 2024-09-23 PROCEDURE — 84132 ASSAY OF SERUM POTASSIUM: CPT | Mod: ,,, | Performed by: CLINICAL MEDICAL LABORATORY

## 2024-09-23 PROCEDURE — 3079F DIAST BP 80-89 MM HG: CPT | Mod: CPTII,,, | Performed by: NURSE PRACTITIONER

## 2024-09-23 PROCEDURE — 4010F ACE/ARB THERAPY RXD/TAKEN: CPT | Mod: CPTII,,, | Performed by: NURSE PRACTITIONER

## 2024-09-23 PROCEDURE — 3008F BODY MASS INDEX DOCD: CPT | Mod: CPTII,,, | Performed by: NURSE PRACTITIONER

## 2024-09-23 PROCEDURE — 99213 OFFICE O/P EST LOW 20 MIN: CPT | Mod: ,,, | Performed by: NURSE PRACTITIONER

## 2024-09-23 PROCEDURE — 3066F NEPHROPATHY DOC TX: CPT | Mod: CPTII,,, | Performed by: NURSE PRACTITIONER

## 2024-09-23 RX ORDER — MELOXICAM 15 MG/1
15 TABLET ORAL DAILY
Qty: 90 TABLET | Refills: 1 | Status: SHIPPED | OUTPATIENT
Start: 2024-09-23

## 2024-09-23 RX ORDER — AMLODIPINE BESYLATE 10 MG/1
10 TABLET ORAL NIGHTLY
Qty: 90 TABLET | Refills: 1 | Status: SHIPPED | OUTPATIENT
Start: 2024-09-23

## 2024-09-23 RX ORDER — ROSUVASTATIN CALCIUM 10 MG/1
10 TABLET, COATED ORAL DAILY
Qty: 90 TABLET | Refills: 1 | Status: SHIPPED | OUTPATIENT
Start: 2024-09-23 | End: 2025-03-22

## 2024-09-23 RX ORDER — PANTOPRAZOLE SODIUM 40 MG/1
40 TABLET, DELAYED RELEASE ORAL DAILY
Qty: 90 TABLET | Refills: 1 | Status: SHIPPED | OUTPATIENT
Start: 2024-09-23

## 2024-09-23 NOTE — PROGRESS NOTES
[facility]    Merlyn Bonner AGPCNP-BC [facility] [address] [address]  [phone]          PATIENT NAME: Maranda Clark  : 1965  DATE: 24  MRN: [id number]          Reason for Visit        Chief Complaint   Patient presents with    Hyperlipidemia     Patient needs a refill on Crestor today.     Hypokalemia     Due for repeat potassium level today.     Peptic Ulcer Disease     Need refill of pantoprazole.    Hypertension     Need refill of AMlodipine    Osteoarthritis     Request refill of Meloxicam    Health Maintenance     Declines all recommended vaccines.        History of Present Illness      Maranda Clark is a 58 y.o. male with chronic conditions of Hypertension, GERD, Vitamin D Deficiency, Osteoarthritis Bilateral Hips, Erectile Dysfunction, and  who presents today for repeat potassium level and medication refills. He is tolerating his medication without any problems. Otherwise, he has no new complaints today.     Care Team:  PCP Dr. Zen Pacheco  Optometry Dr. Gerry Chua  Orthopedics Arthur Cunningham, ELSA  Cardiologist Dr. Torey Marroquin (initial appointment 07/10/2024)    MEDICAL / SURGICAL / SOCIAL HISTORY     Past Medical History:   Diagnosis Date    GERD (gastroesophageal reflux disease)     Hypertension     Osteoarthritis, hip, bilateral     right greather than left       Past Surgical History:   Procedure Laterality Date    CHOLECYSTECTOMY  2016       Social History     Tobacco Use    Smoking status: Never     Passive exposure: Never    Smokeless tobacco: Never   Substance Use Topics    Alcohol use: Yes     Alcohol/week: 6.0 standard drinks of alcohol     Types: 6 Cans of beer per week    Drug use: Never         I personally reviewed all past medical, surgical, and social.     Review of Systems   Constitutional:  Negative for chills and fever.   HENT:  Negative for congestion, hearing loss,  rhinorrhea and sore throat.    Eyes:  Negative for visual disturbance.   Respiratory:  Negative for cough and shortness of breath.    Cardiovascular:  Negative for chest pain.   Gastrointestinal:  Negative for abdominal pain, constipation and diarrhea.   Genitourinary:  Negative for dysuria, hematuria and urgency.   Musculoskeletal:  Positive for arthralgias (bilateral hips). Negative for back pain, myalgias and neck pain.   Neurological:  Negative for dizziness and headaches.   Psychiatric/Behavioral:  Negative for dysphoric mood and sleep disturbance. The patient is not nervous/anxious.         MEDICATIONS / ALLERGIES / HM     Current Outpatient Medications   Medication Sig Dispense Refill    ergocalciferol (ERGOCALCIFEROL) 50,000 unit Cap Take 1 capsule (50,000 Units total) by mouth every 7 days. 12 capsule 2    ketoconazole (NIZORAL) 2 % cream Apply topically once daily. 120 g 2    losartan (COZAAR) 25 MG tablet Take 1 tablet (25 mg total) by mouth once daily. 90 tablet 3    metoprolol succinate (TOPROL-XL) 200 MG 24 hr tablet Take 1 tablet (200 mg total) by mouth once daily. 90 tablet 1    sildenafiL (VIAGRA) 100 MG tablet Take 1 tablet (100 mg total) by mouth daily as needed for Erectile Dysfunction. 15 tablet 3    amLODIPine (NORVASC) 10 MG tablet Take 1 tablet (10 mg total) by mouth every evening. 90 tablet 1    meloxicam (MOBIC) 15 MG tablet Take 1 tablet (15 mg total) by mouth once daily. 90 tablet 1    pantoprazole (PROTONIX) 40 MG tablet Take 1 tablet (40 mg total) by mouth once daily. 90 tablet 1    rosuvastatin (CRESTOR) 10 MG tablet Take 1 tablet (10 mg total) by mouth once daily. 90 tablet 1     No current facility-administered medications for this visit.       Review of patient's allergies indicates:  No Known Allergies    Immunization History   Administered Date(s) Administered    COVID-19, MRNA, LN-S, PF (Pfizer) (Purple Cap) 08/29/2021, 09/19/2021    Tdap 01/27/2022        Health Maintenance    Topic Date Due    Shingles Vaccine (1 of 2) Never done-declined     Colorectal Cancer Screening  04/11/2026    Lipid Panel  03/20/2029    TETANUS VACCINE  01/27/2032    Hepatitis C Screening  Completed        Physical Exam     Physical Exam  Constitutional:       General: He is not in acute distress.     Appearance: He is obese.   HENT:      Head: Normocephalic.      Right Ear: External ear normal.      Left Ear: External ear normal.   Cardiovascular:      Rate and Rhythm: Normal rate and regular rhythm.      Pulses: Normal pulses.      Heart sounds: Normal heart sounds.   Pulmonary:      Effort: Pulmonary effort is normal.      Breath sounds: Normal breath sounds.   Abdominal:      Palpations: Abdomen is soft.      Tenderness: There is no abdominal tenderness.   Skin:     General: Skin is warm and dry.   Neurological:      Mental Status: He is alert and oriented to person, place, and time.   Psychiatric:         Mood and Affect: Mood normal.         Behavior: Behavior normal.          Laboratory:    Lab Results   Component Value Date    GLU 99 06/20/2024     06/20/2024    K 3.2 (L) 06/20/2024     06/20/2024    CO2 25 06/20/2024    BUN 9 06/20/2024    CREATININE 1.08 06/20/2024    CALCIUM 9.9 06/20/2024    PROT 8.4 (H) 03/20/2024    PROT 8.6 (H) 03/20/2024    ALBUMIN 4.4 03/20/2024    ALBUMIN 4.3 03/20/2024    BILITOT 0.3 03/20/2024    BILITOT 0.3 03/20/2024    ALKPHOS 80 03/20/2024    ALKPHOS 73 03/20/2024    AST 21 03/20/2024    AST 24 03/20/2024    ALT 36 03/20/2024    ALT 35 03/20/2024    ANIONGAP 13 06/20/2024    ESTGFRAFRICA 77 10/28/2020    EGFRNONAA 77 06/27/2022       Lab Results   Component Value Date    WBC 9.79 03/20/2024    RBC 4.84 03/20/2024    HGB 14.7 03/20/2024    HCT 42.8 03/20/2024    MCV 88.4 03/20/2024    RDW 13.2 03/20/2024     03/20/2024        Lab Results   Component Value Date    CHOL 135 03/20/2024    TRIG 47 03/20/2024    HDL 59 03/20/2024    LDLCALC 67 03/20/2024  "      Lab Results   Component Value Date    TSH 1.150 06/20/2024       Lab Results   Component Value Date    HGBA1C 5.9 06/20/2024    ESTIMATEDAVG 123 06/20/2024        No results found for: "[id number]"    Lab Results   Component Value Date    BOFHKDXS99QJ 34.6 06/20/2024       Lab Results   Component Value Date    PSA 0.884 06/20/2024         Point Of Care Testing:    No results found for: "WBCUR", "NITRITE", "UROBILINOGEN", "PROTEINPOC", "PHUR", "BLOODUR", "SPECGRAV", "KETONESU", "BILIRUBINPOC", "GLUCOSEUR"    No results found for: "[id number]", "FLUAMOLEC", "FLUBMOLEC", "MOLSTREPAPOC"          Assessment/Plan     Hypokalemia  -     06/20/2024 K+ 3.2 (3.5-5.1)  -     Potassium; Future; Expected date: 09/23/2024    Primary hypertension  -      /88 HR 60  -     amLODIPine (NORVASC) 10 MG tablet; Take 1 tablet (10 mg total) by mouth every evening.  Dispense: 90 tablet; Refill: 1    Primary osteoarthritis of both hips  -     meloxicam (MOBIC) 15 MG tablet; Take 1 tablet (15 mg total) by mouth once daily.  Dispense: 90 tablet; Refill: 1    PUD (peptic ulcer disease)  -     pantoprazole (PROTONIX) 40 MG tablet; Take 1 tablet (40 mg total) by mouth once daily.  Dispense: 90 tablet; Refill: 1    Mixed hyperlipidemia  -     03/20/2024 Chol 135 Trig 47 LDL 67 HDL 59  -     rosuvastatin (CRESTOR) 10 MG tablet; Take 1 tablet (10 mg total) by mouth once daily.  Dispense: 90 tablet; Refill: 1    Future Appointments   Date Time Provider Department Center   3/24/2025  3:00 PM Zen Pacheco DO [facility]       Workup results were reviewed and all questions were answered. Diagnosis and treatment options were discussed and the patient  is amenable with the overall treatment plan. Verbal and written discharge instructions were given including to return to clinic/ED with any acute worsening of symptoms or failure of symptoms to improve. The reasons for return to the clinic/ED were explained in lay terms. No " further intervention is warranted at this time. The patient agrees with the plan, expresses understanding, is hemodynamically stable and in no acute distress.     All questions answered to desired level of satisfaction          COURTNEY Zamora-BC  [facility]

## 2024-12-08 DIAGNOSIS — E55.9 VITAMIN D DEFICIENCY: ICD-10-CM

## 2024-12-17 RX ORDER — ERGOCALCIFEROL 1.25 MG/1
50000 CAPSULE ORAL
Qty: 12 CAPSULE | Refills: 0 | Status: SHIPPED | OUTPATIENT
Start: 2024-12-17

## 2025-03-14 PROBLEM — N52.02 CORPORO-VENOUS OCCLUSIVE ERECTILE DYSFUNCTION: Status: ACTIVE | Noted: 2025-03-14

## 2025-03-14 PROBLEM — B35.4: Status: ACTIVE | Noted: 2025-03-14

## 2025-03-14 PROBLEM — Z00.00 ROUTINE GENERAL MEDICAL EXAMINATION AT A HEALTH CARE FACILITY: Status: ACTIVE | Noted: 2023-04-10

## 2025-03-14 NOTE — PROGRESS NOTES
Zen Pacheco DO   [address]  [place]  [address]      PATIENT NAME: Maranda Clark  : 1965  DATE: 3/20/24  MRN: [id number]      Billing Provider: Zen Pacheco DO  Level of Service: DE OFFICE/OUTPT VISIT, EST, LEVL IV, 30-39 MIN  Patient PCP Information       Provider PCP Type    Zen Pacheco DO General            Reason for Visit / Chief Complaint: wellness ([facility] wellness visit ), Health Maintenance (Shingles Vaccine(1 of 2) declined/Influenza Vaccine(1) declined/COVID-19 Vaccine(3 - 2023-24 season) declined /Hemoglobin A1c (Prediabetes) ordered /), Rash (Large dark area on lower/mid back. Pt states rash is spreading to bilateral sides and left upper chest. Pt states area itch. Pt states areas are painful at times. ), and Hip Pain (Pt states his hips are still hurting. Right being worse than left, but states pain is better than it was at last appointment 24. Pt denies pain at this time. )       Update PCP  Update Chief Complaint         History of Present Illness / Problem Focused Workflow     Maranda Clark presents to the clinic with wellness ([facility] wellness visit ), Health Maintenance (Shingles Vaccine(1 of 2) declined/Influenza Vaccine(1) declined/COVID-19 Vaccine(3 - 2023-24 season) declined /Hemoglobin A1c (Prediabetes) ordered /), Rash (Large dark area on lower/mid back. Pt states rash is spreading to bilateral sides and left upper chest. Pt states area itch. Pt states areas are painful at times. ), and Hip Pain (Pt states his hips are still hurting. Right being worse than left, but states pain is better than it was at last appointment 24. Pt denies pain at this time. )     Patient is in today for routine physical.  He does have a rash on his back which is pruritic.  He also has nose blood pressures been well controlled.  He has been on pantoprazole for indigestion that is improved.  He is on meloxicam currently for joint pain in the states that is  much improved.  Patient on a low-fat low-cholesterol diet.  No chest pain no shortness in breath palpitations PND orthopnea.    Rash  Pertinent negatives include no congestion, cough, diarrhea, eye pain, fatigue, fever, shortness of breath, sore throat or vomiting.   Hip Pain   Pertinent negatives include no numbness.       Review of Systems     Review of Systems   Constitutional:  Negative for activity change, appetite change, chills, fatigue and fever.   HENT:  Negative for nasal congestion, ear discharge, ear pain, mouth dryness, mouth sores, postnasal drip, sinus pressure/congestion, sore throat and voice change.    Eyes:  Negative for pain, discharge, redness, itching and visual disturbance.   Respiratory:  Negative for apnea, cough, chest tightness, shortness of breath and wheezing.    Cardiovascular:  Negative for chest pain, palpitations and leg swelling.   Gastrointestinal:  Negative for abdominal distention, abdominal pain, anal bleeding, blood in stool, change in bowel habit, constipation, diarrhea, nausea, vomiting and reflux.   Endocrine: Negative for cold intolerance, heat intolerance, polydipsia, polyphagia and polyuria.   Genitourinary:  Negative for difficulty urinating, enuresis, erectile dysfunction, frequency, genital sores, hematuria and urgency.   Musculoskeletal:  Positive for arthralgias and back pain. Negative for gait problem, leg pain, myalgias and neck pain.   Integumentary:  Positive for rash. Negative for mole/lesion, breast mass and breast discharge.   Allergic/Immunologic: Negative for environmental allergies and food allergies.   Neurological:  Negative for dizziness, vertigo, tremors, seizures, syncope, facial asymmetry, speech difficulty, weakness, light-headedness, numbness, headaches, memory loss and coordination difficulties.   Hematological:  Negative for adenopathy. Does not bruise/bleed easily.   Psychiatric/Behavioral:  Negative for agitation, behavioral problems, confusion,  decreased concentration, dysphoric mood, hallucinations, self-injury, sleep disturbance and suicidal ideas. The patient is not nervous/anxious and is not hyperactive.    Breast: Negative for mass      Medical / Social / Family History     Past Medical History:   Diagnosis Date    GERD (gastroesophageal reflux disease)     Hypertension     Osteoarthritis, hip, bilateral     right greather than left       Past Surgical History:   Procedure Laterality Date    CHOLECYSTECTOMY  2016       Social History    reports that he has never smoked. He has never been exposed to tobacco smoke. He has never used smokeless tobacco. He reports current alcohol use of about 6.0 standard drinks of alcohol per week. He reports that he does not use drugs.    Family History  's family history includes Diabetes in his sister; Heart attack in his mother; Hypertension in his daughter, daughter, daughter, mother, sister, sister, and sister; No Known Problems in his brother, father, maternal grandfather, maternal grandmother, paternal grandfather, and paternal grandmother.    Medications and Allergies     Medications  Outpatient Medications Marked as Taking for the 3/20/24 encounter (Office Visit) with Zen Pacheco, DO   Medication Sig Dispense Refill    [DISCONTINUED] amLODIPine (NORVASC) 10 MG tablet Take 1 tablet (10 mg total) by mouth every evening. 90 tablet 1    [DISCONTINUED] clobetasol 0.05% (TEMOVATE) 0.05 % Oint Apply topically 2 (two) times daily. 30 g 5    [DISCONTINUED] meloxicam (MOBIC) 15 MG tablet Take 1 tablet (15 mg total) by mouth Daily. 90 tablet 1    [DISCONTINUED] metoprolol succinate (TOPROL-XL) 200 MG 24 hr tablet Take 2 tablets (400 mg total) by mouth once daily. 180 tablet 1    [DISCONTINUED] pantoprazole (PROTONIX) 40 MG tablet Take 1 tablet (40 mg total) by mouth 2 (two) times daily. Take twice daily for 6 weeks then daily 90 tablet 1    [DISCONTINUED] rosuvastatin (CRESTOR) 10 MG tablet Take 1 tablet (10  mg total) by mouth once daily. 90 tablet 3    [DISCONTINUED] triamcinolone acetonide 0.025% (KENALOG) 0.025 % cream Apply topically 2 (two) times daily. Apply topically to back and waist area 454 g 1       Allergies  Review of patient's allergies indicates:  No Known Allergies    Physical Examination     Vitals:    03/20/24 1549   BP: (!) 150/95   Pulse: (!) 50   Resp: 19   Temp: 98.4 °F (36.9 °C)     Physical Exam  Constitutional:       General: He is not in acute distress.     Appearance: Normal appearance. He is obese.   HENT:      Head: Normocephalic.      Nose: Nose normal.      Mouth/Throat:      Mouth: Mucous membranes are moist.      Pharynx: Oropharynx is clear. No oropharyngeal exudate or posterior oropharyngeal erythema.   Eyes:      General: No scleral icterus.        Right eye: No discharge.         Left eye: No discharge.      Conjunctiva/sclera: Conjunctivae normal.      Pupils: Pupils are equal, round, and reactive to light.   Cardiovascular:      Rate and Rhythm: Normal rate and regular rhythm.      Pulses: Normal pulses.      Heart sounds: Normal heart sounds. No murmur heard.  Pulmonary:      Effort: Pulmonary effort is normal.      Breath sounds: Normal breath sounds. No wheezing.   Abdominal:      General: Abdomen is flat. Bowel sounds are normal.      Tenderness: There is no abdominal tenderness.   Musculoskeletal:         General: Normal range of motion.   Skin:     General: Skin is warm.      Capillary Refill: Capillary refill takes less than 2 seconds.      Findings: Rash present.      Comments: Low back there is an erythematous rash with raised edges middle of his lumbar spine area.   Neurological:      General: No focal deficit present.      Mental Status: He is alert and oriented to person, place, and time. Mental status is at baseline.   Psychiatric:         Mood and Affect: Mood normal.         Behavior: Behavior normal.         Thought Content: Thought content normal.         Judgment:  Judgment normal.               Lab Results   Component Value Date    WBC 9.79 03/20/2024    HGB 14.7 03/20/2024    HCT 42.8 03/20/2024    MCV 88.4 03/20/2024     03/20/2024          Sodium   Date Value Ref Range Status   06/20/2024 141 136 - 145 mmol/L Final     Potassium   Date Value Ref Range Status   09/23/2024 3.3 (L) 3.5 - 5.1 mmol/L Final     Chloride   Date Value Ref Range Status   06/20/2024 106 98 - 107 mmol/L Final     CO2   Date Value Ref Range Status   06/20/2024 25 21 - 32 mmol/L Final     Glucose   Date Value Ref Range Status   06/20/2024 99 74 - 106 mg/dL Final     BUN   Date Value Ref Range Status   06/20/2024 9 7 - 18 mg/dL Final     Creatinine   Date Value Ref Range Status   06/20/2024 1.08 0.70 - 1.30 mg/dL Final     Calcium   Date Value Ref Range Status   06/20/2024 9.9 8.5 - 10.1 mg/dL Final     Total Protein   Date Value Ref Range Status   03/20/2024 8.4 (H) 6.4 - 8.2 g/dL Final   03/20/2024 8.6 (H) 6.4 - 8.2 g/dL Final     Albumin   Date Value Ref Range Status   03/20/2024 4.4 3.5 - 5.0 g/dL Final   03/20/2024 4.3 3.5 - 5.0 g/dL Final     Bilirubin, Total   Date Value Ref Range Status   03/20/2024 0.3 >0.0 - 1.2 mg/dL Final   03/20/2024 0.3 >0.0 - 1.2 mg/dL Final     Alk Phos   Date Value Ref Range Status   03/20/2024 80 45 - 115 U/L Final   03/20/2024 73 45 - 115 U/L Final     AST   Date Value Ref Range Status   03/20/2024 21 15 - 37 U/L Final   03/20/2024 24 15 - 37 U/L Final     ALT   Date Value Ref Range Status   03/20/2024 36 16 - 61 U/L Final   03/20/2024 35 16 - 61 U/L Final     Anion Gap   Date Value Ref Range Status   06/20/2024 13 7 - 16 mmol/L Final     eGFR    Date Value Ref Range Status   10/28/2020 77       Comment:     The above 11 analytes were performed by [id number] [place] [address],[place] [id number]     eGFR   Date Value Ref Range Status   06/27/2022 77 >=60 mL/min/1.73m² Final      X-Ray Hip 1 View Left (with Pelvis when  performed)  Narrative: EXAMINATION:  XR HIP 1 VIEW LEFT (WITH PELVIS WHEN PERFORMED)    CLINICAL HISTORY:  Pain in hip    TECHNIQUE:  Left hip and AP pelvis, two views:    COMPARISON:  None.    FINDINGS:  Osteoarthritis is present in the left hip.  No abnormalities are seen elsewhere in the left pelvis.  There is osteoarthritis in the right hip.  SI joints are normal.  Impression: Osteoarthritis is present in both hips, right side greater than left.    [facility] of service[facility]    Electronically signed by: Mireille Cancino  Date:    02/12/2024  Time:    12:24     Procedures   Lab Results   Component Value Date    CHOL 135 03/20/2024    CHOL 152 02/13/2023    CHOL 186 01/09/2023     Lab Results   Component Value Date    HDL 59 03/20/2024    HDL 54 02/13/2023    HDL 51 01/09/2023     Lab Results   Component Value Date    LDLCALC 67 03/20/2024    LDLCALC 88 02/13/2023    LDLCALC 124 01/09/2023     Lab Results   Component Value Date    TRIG 47 03/20/2024    TRIG 52 02/13/2023    TRIG 56 01/09/2023     Lab Results   Component Value Date    CHOLHDL 2.3 03/20/2024    CHOLHDL 2.8 02/13/2023    CHOLHDL 3.6 01/09/2023      Lab Results   Component Value Date    HGBA1C 5.9 06/20/2024      Lab Results   Component Value Date    TSH 1.150 06/20/2024      Assessment and Plan (including Health Maintenance)      Problem List  Smart Sets  Document Outside HM   :    Plan:         Health Maintenance Due   Topic Date Due    Shingles Vaccine (1 of 2) Never done    Pneumococcal Vaccines (Age 50+) (1 of 1 - PCV) Never done    Influenza Vaccine (1) 09/01/2024    COVID-19 Vaccine (3 - 2024-25 season) 09/01/2024       Problem List Items Addressed This Visit       Routine general medical examination at a health care facility - Primary    Current Assessment & Plan   Patient noted to back which we diagnosed has tenia corporis.  His arthritis in his hips is much improved so will keep him on his current treatment and because of his GI  upset will not use NSAIDs.  Patient's cholesterol has been well controlled.  Blood pressure is well controlled.  Follow-up every 3 months.  Overall general health is much improved.         Hypertension    Current Assessment & Plan   Hypertension well controlled no change in medication.  Follow-up in 3 months.  Will check CBC and CMP yearly.  Goal is blood pressure 120/70          Relevant Orders    Lipid Panel (Completed)    Comprehensive Metabolic Panel (Completed)    CBC Auto Differential (Completed)    Mixed hyperlipidemia    Overview   AHA 10 year risk is 10.5 %         Current Assessment & Plan   Last LDL was 67.  Patient currently on Crestor and will not changes treatment at this time.  Recheck in 6 months.         Relevant Orders    Lipid Panel (Completed)    PUD (peptic ulcer disease)    Current Assessment & Plan   No indigestion heartburn currently on pantoprazole.  Will continue current dosage.  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  If not improved in 3 months will add on oral agent.  Follow-up in 3 months or p.r.n.         Relevant Medications    ketoconazole (NIZORAL) 2 % cream    Corporo-venous occlusive erectile dysfunction    Current Assessment & Plan   History of erectile dysfunction.  Will continue his Viagra at current dose.  Follow-up every 6 months         Relevant Medications    sildenafiL (VIAGRA) 100 MG tablet     Other Visit Diagnoses         Prediabetes        Relevant Orders    Hemoglobin A1C (Completed)            Health Maintenance Topics with due status: Not Due       Topic Last Completion Date    TETANUS VACCINE 01/27/2022    Colorectal Cancer Screening 04/11/2023    Lipid Panel 03/20/2024    Hemoglobin A1c (Prediabetes) 06/20/2024    RSV Vaccine (Age 60+ and Pregnant patients) Not Due       Future Appointments   Date Time Provider Department Center    3/24/2025  3:00 PM Zen Pacheco DO [facility]        Follow up in about 3 months (around 6/20/2024).     Signature:  Zen Pacheco DO  [address] [place]  [address]    Date of encounter: 3/20/24

## 2025-03-14 NOTE — ASSESSMENT & PLAN NOTE
Last LDL was 67.  Patient currently on Crestor and will not changes treatment at this time.  Recheck in 6 months.

## 2025-03-14 NOTE — ASSESSMENT & PLAN NOTE
History of erectile dysfunction.  Will continue his Viagra at current dose.  Follow-up every 6 months

## 2025-03-14 NOTE — ASSESSMENT & PLAN NOTE
Rash in his back is likely tenia so will treat with ketoconazole cream.  If not improved in 3 months will add on oral agent.  Follow-up in 3 months or p.r.n.

## 2025-03-14 NOTE — ASSESSMENT & PLAN NOTE
No indigestion heartburn currently on pantoprazole.  Will continue current dosage.  Follow-up in 3 months

## 2025-03-14 NOTE — ASSESSMENT & PLAN NOTE
Patient noted to back which we diagnosed has tenia corporis.  His arthritis in his hips is much improved so will keep him on his current treatment and because of his GI upset will not use NSAIDs.  Patient's cholesterol has been well controlled.  Blood pressure is well controlled.  Follow-up every 3 months.  Overall general health is much improved.

## 2025-03-24 ENCOUNTER — OFFICE VISIT (OUTPATIENT)
Dept: FAMILY MEDICINE | Facility: CLINIC | Age: 60
End: 2025-03-24
Payer: COMMERCIAL

## 2025-03-24 VITALS
BODY MASS INDEX: 31.58 KG/M2 | SYSTOLIC BLOOD PRESSURE: 135 MMHG | DIASTOLIC BLOOD PRESSURE: 78 MMHG | OXYGEN SATURATION: 99 % | RESPIRATION RATE: 18 BRPM | HEIGHT: 72 IN | WEIGHT: 233.19 LBS | HEART RATE: 62 BPM | TEMPERATURE: 98 F

## 2025-03-24 DIAGNOSIS — E55.9 VITAMIN D DEFICIENCY: ICD-10-CM

## 2025-03-24 DIAGNOSIS — K27.9 PUD (PEPTIC ULCER DISEASE): ICD-10-CM

## 2025-03-24 DIAGNOSIS — N52.02 CORPORO-VENOUS OCCLUSIVE ERECTILE DYSFUNCTION: ICD-10-CM

## 2025-03-24 DIAGNOSIS — I10 PRIMARY HYPERTENSION: ICD-10-CM

## 2025-03-24 DIAGNOSIS — L30.9 ECZEMA, UNSPECIFIED TYPE: ICD-10-CM

## 2025-03-24 DIAGNOSIS — Z00.00 ROUTINE GENERAL MEDICAL EXAMINATION AT A HEALTH CARE FACILITY: Primary | ICD-10-CM

## 2025-03-24 DIAGNOSIS — E78.2 MIXED HYPERLIPIDEMIA: ICD-10-CM

## 2025-03-24 DIAGNOSIS — M16.0 PRIMARY OSTEOARTHRITIS OF BOTH HIPS: ICD-10-CM

## 2025-03-24 DIAGNOSIS — L30.9 DERMATITIS: ICD-10-CM

## 2025-03-24 LAB
ALBUMIN SERPL BCP-MCNC: 3.9 G/DL (ref 3.5–5)
ALBUMIN/GLOB SERPL: 1.1 {RATIO}
ALP SERPL-CCNC: 56 U/L (ref 40–150)
ALT SERPL W P-5'-P-CCNC: 20 U/L
ANION GAP SERPL CALCULATED.3IONS-SCNC: 13 MMOL/L (ref 7–16)
AST SERPL W P-5'-P-CCNC: 27 U/L (ref 11–45)
BASOPHILS # BLD AUTO: 0.1 K/UL (ref 0–0.2)
BASOPHILS NFR BLD AUTO: 1 % (ref 0–1)
BILIRUB SERPL-MCNC: 0.5 MG/DL
BUN SERPL-MCNC: 12 MG/DL (ref 8–26)
BUN/CREAT SERPL: 11 (ref 6–20)
CALCIUM SERPL-MCNC: 9.5 MG/DL (ref 8.4–10.2)
CHLORIDE SERPL-SCNC: 107 MMOL/L (ref 98–107)
CHOLEST SERPL-MCNC: 125 MG/DL
CHOLEST/HDLC SERPL: 2.5 {RATIO}
CO2 SERPL-SCNC: 25 MMOL/L (ref 22–29)
CREAT SERPL-MCNC: 1.1 MG/DL (ref 0.72–1.25)
DIFFERENTIAL METHOD BLD: ABNORMAL
EGFR (NO RACE VARIABLE) (RUSH/TITUS): 77 ML/MIN/1.73M2
EOSINOPHIL # BLD AUTO: 0.42 K/UL (ref 0–0.5)
EOSINOPHIL NFR BLD AUTO: 4.2 % (ref 1–4)
ERYTHROCYTE [DISTWIDTH] IN BLOOD BY AUTOMATED COUNT: 14.2 % (ref 11.5–14.5)
GLOBULIN SER-MCNC: 3.4 G/DL (ref 2–4)
GLUCOSE SERPL-MCNC: 105 MG/DL (ref 74–100)
HCT VFR BLD AUTO: 42.4 % (ref 40–54)
HDLC SERPL-MCNC: 50 MG/DL (ref 35–60)
HGB BLD-MCNC: 13.7 G/DL (ref 13.5–18)
IMM GRANULOCYTES # BLD AUTO: 0.03 K/UL (ref 0–0.04)
IMM GRANULOCYTES NFR BLD: 0.3 % (ref 0–0.4)
LDLC SERPL CALC-MCNC: 66 MG/DL
LDLC/HDLC SERPL: 1.3 {RATIO}
LYMPHOCYTES # BLD AUTO: 2.78 K/UL (ref 1–4.8)
LYMPHOCYTES NFR BLD AUTO: 27.6 % (ref 27–41)
MCH RBC QN AUTO: 29.7 PG (ref 27–31)
MCHC RBC AUTO-ENTMCNC: 32.3 G/DL (ref 32–36)
MCV RBC AUTO: 91.8 FL (ref 80–96)
MONOCYTES # BLD AUTO: 1.21 K/UL (ref 0–0.8)
MONOCYTES NFR BLD AUTO: 12 % (ref 2–6)
MPC BLD CALC-MCNC: 12.2 FL (ref 9.4–12.4)
NEUTROPHILS # BLD AUTO: 5.55 K/UL (ref 1.8–7.7)
NEUTROPHILS NFR BLD AUTO: 54.9 % (ref 53–65)
NONHDLC SERPL-MCNC: 75 MG/DL
NRBC # BLD AUTO: 0 X10E3/UL
NRBC, AUTO (.00): 0 %
PLATELET # BLD AUTO: 228 K/UL (ref 150–400)
POTASSIUM SERPL-SCNC: 3.8 MMOL/L (ref 3.5–5.1)
PROT SERPL-MCNC: 7.3 G/DL (ref 6.4–8.3)
RBC # BLD AUTO: 4.62 M/UL (ref 4.6–6.2)
SODIUM SERPL-SCNC: 141 MMOL/L (ref 136–145)
TRIGL SERPL-MCNC: 47 MG/DL (ref 34–140)
VLDLC SERPL-MCNC: 9 MG/DL
WBC # BLD AUTO: 10.09 K/UL (ref 4.5–11)

## 2025-03-24 PROCEDURE — 85025 COMPLETE CBC W/AUTO DIFF WBC: CPT | Mod: ,,, | Performed by: CLINICAL MEDICAL LABORATORY

## 2025-03-24 PROCEDURE — 3075F SYST BP GE 130 - 139MM HG: CPT | Mod: CPTII,,, | Performed by: FAMILY MEDICINE

## 2025-03-24 PROCEDURE — 80053 COMPREHEN METABOLIC PANEL: CPT | Mod: ,,, | Performed by: CLINICAL MEDICAL LABORATORY

## 2025-03-24 PROCEDURE — 4010F ACE/ARB THERAPY RXD/TAKEN: CPT | Mod: CPTII,,, | Performed by: FAMILY MEDICINE

## 2025-03-24 PROCEDURE — 99396 PREV VISIT EST AGE 40-64: CPT | Mod: ,,, | Performed by: FAMILY MEDICINE

## 2025-03-24 PROCEDURE — 80061 LIPID PANEL: CPT | Mod: ,,, | Performed by: CLINICAL MEDICAL LABORATORY

## 2025-03-24 PROCEDURE — 3078F DIAST BP <80 MM HG: CPT | Mod: CPTII,,, | Performed by: FAMILY MEDICINE

## 2025-03-24 PROCEDURE — 1159F MED LIST DOCD IN RCRD: CPT | Mod: CPTII,,, | Performed by: FAMILY MEDICINE

## 2025-03-24 PROCEDURE — 3008F BODY MASS INDEX DOCD: CPT | Mod: CPTII,,, | Performed by: FAMILY MEDICINE

## 2025-03-24 RX ORDER — TRIAMCINOLONE ACETONIDE 1 MG/G
CREAM TOPICAL 2 TIMES DAILY
Qty: 60 G | Refills: 5 | Status: SHIPPED | OUTPATIENT
Start: 2025-03-24

## 2025-03-24 RX ORDER — MELOXICAM 15 MG/1
15 TABLET ORAL DAILY
Qty: 90 TABLET | Refills: 1 | Status: SHIPPED | OUTPATIENT
Start: 2025-03-24

## 2025-03-24 RX ORDER — ONDANSETRON 8 MG/1
8 TABLET, ORALLY DISINTEGRATING ORAL EVERY 6 HOURS PRN
Qty: 30 TABLET | Refills: 2 | Status: SHIPPED | OUTPATIENT
Start: 2025-03-24

## 2025-03-24 RX ORDER — ROSUVASTATIN CALCIUM 10 MG/1
10 TABLET, COATED ORAL DAILY
Qty: 90 TABLET | Refills: 1 | Status: SHIPPED | OUTPATIENT
Start: 2025-03-24 | End: 2025-09-20

## 2025-03-24 RX ORDER — AMLODIPINE BESYLATE 10 MG/1
10 TABLET ORAL NIGHTLY
Qty: 90 TABLET | Refills: 1 | Status: SHIPPED | OUTPATIENT
Start: 2025-03-24

## 2025-03-24 RX ORDER — ERGOCALCIFEROL 1.25 MG/1
50000 CAPSULE ORAL
Qty: 12 CAPSULE | Refills: 3 | Status: SHIPPED | OUTPATIENT
Start: 2025-03-24

## 2025-03-24 RX ORDER — SILDENAFIL 100 MG/1
100 TABLET, FILM COATED ORAL DAILY PRN
Qty: 15 TABLET | Refills: 3 | Status: SHIPPED | OUTPATIENT
Start: 2025-03-24 | End: 2026-03-24

## 2025-03-24 RX ORDER — PANTOPRAZOLE SODIUM 40 MG/1
40 TABLET, DELAYED RELEASE ORAL 2 TIMES DAILY
Qty: 180 TABLET | Refills: 1 | Status: SHIPPED | OUTPATIENT
Start: 2025-03-24

## 2025-03-24 NOTE — ASSESSMENT & PLAN NOTE
Patient has a dermatitis with did occurring in the axilla and the antecubital feels as well as behind the knees.  Triamcinolone cream with 60 g filled apply b.i.d. to the rash and follow-up as needed if it does not improve.  DC the ketoconazole cream.

## 2025-03-24 NOTE — ASSESSMENT & PLAN NOTE
Hypertension well controlled no change in medication.  Follow-up in 3 months.  Will check CBC and CMP yearly.  Goal is blood pressure 120/70 ordered a lipid panel also.

## 2025-03-24 NOTE — PROGRESS NOTES
Zen Pacheco DO   [address] [place]  [address]      PATIENT NAME: Maranda Clark  : 1965  DATE: 3/24/25  MRN: [id number]      Billing Provider: Zen Pacheco DO  Level of Service:   Patient PCP Information       Provider PCP Type    Zen Pacheco DO General            Reason for Visit / Chief Complaint: Annual Exam (Mr. Clark is a 58 y/o male presenting today for an annual wellness exam. States that he is doing well. ) and Health Maintenance (Shingles Vaccine(1 of 2) Never done - denies /Pneumococcal Vaccines (Age 50+)(1 of 1 - PCV) Never done - denies /Influenza Vaccine(1) due on 2024 - denies/COVID-19 Vaccine(3 - 2024-25 season) due on 2024- denies)       Update PCP  Update Chief Complaint         History of Present Illness / Problem Focused Workflow     Maranda Clark presents to the clinic with Annual Exam (Mr. Clark is a 58 y/o male presenting today for an annual wellness exam. States that he is doing well. ) and Health Maintenance (Shingles Vaccine(1 of 2) Never done - denies /Pneumococcal Vaccines (Age 50+)(1 of 1 - PCV) Never done - denies /Influenza Vaccine(1) due on 2024 - denies/COVID-19 Vaccine(3 - 2024-25 season) due on 2024- denies)     Patient is in for routine follow-up and checkup with complaints of some nausea that comes and goes and is usually associated with indigestion were some type of spicy food.  Patient states he will throw up at times.  He denies blood in his vomit or black tarry stools.  Patient does have history of peptic ulcer disease is taking pantoprazole.  Patient denies any chest pain shortness breast palpitations PND orthopnea in his tolerating his blood pressure medicines.  He does have a rash that is in his axilla as well as in the elbows as behind the knees also that is pruritic.  He previously been treated with ketoconazole as a possible tenia corporis but did not help.        Review of Systems      Review of Systems   Constitutional:  Negative for activity change, appetite change, chills, fatigue and fever.   HENT:  Negative for nasal congestion, ear discharge, ear pain, mouth dryness, mouth sores, postnasal drip, sinus pressure/congestion, sore throat and voice change.    Eyes:  Negative for pain, discharge, redness, itching and visual disturbance.   Respiratory:  Negative for apnea, cough, chest tightness, shortness of breath and wheezing.    Cardiovascular:  Negative for chest pain, palpitations and leg swelling.   Gastrointestinal:  Positive for nausea, vomiting and reflux. Negative for abdominal distention, abdominal pain, anal bleeding, blood in stool, change in bowel habit, constipation and diarrhea.   Endocrine: Negative for cold intolerance, heat intolerance, polydipsia, polyphagia and polyuria.   Genitourinary:  Negative for difficulty urinating, enuresis, erectile dysfunction, frequency, genital sores, hematuria and urgency.   Musculoskeletal:  Negative for arthralgias, back pain, gait problem, leg pain, myalgias and neck pain.   Integumentary:  Positive for rash. Negative for mole/lesion, breast mass and breast discharge.   Allergic/Immunologic: Negative for environmental allergies and food allergies.   Neurological:  Negative for dizziness, vertigo, tremors, seizures, syncope, facial asymmetry, speech difficulty, weakness, light-headedness, numbness, headaches, memory loss and coordination difficulties.   Hematological:  Negative for adenopathy. Does not bruise/bleed easily.   Psychiatric/Behavioral:  Negative for agitation, behavioral problems, confusion, decreased concentration, dysphoric mood, hallucinations, self-injury, sleep disturbance and suicidal ideas. The patient is not nervous/anxious and is not hyperactive.    Breast: Negative for mass      Medical / Social / Family History     Past Medical History:   Diagnosis Date    GERD (gastroesophageal reflux disease)     Hypertension      Osteoarthritis, hip, bilateral     right greather than left       Past Surgical History:   Procedure Laterality Date    CHOLECYSTECTOMY  2016       Social History    reports that he has been smoking cigars. He has never been exposed to tobacco smoke. He has never used smokeless tobacco. He reports current alcohol use of about 6.0 standard drinks of alcohol per week. He reports that he does not use drugs.    Family History  's family history includes Diabetes in his sister; Heart attack in his mother; Hypertension in his daughter, daughter, daughter, mother, sister, sister, and sister; No Known Problems in his brother, father, maternal grandfather, maternal grandmother, paternal grandfather, and paternal grandmother.    Medications and Allergies     Medications  Outpatient Medications Marked as Taking for the 3/24/25 encounter (Office Visit) with Zen Pacheco, DO   Medication Sig Dispense Refill    ketoconazole (NIZORAL) 2 % cream Apply topically once daily. 120 g 2    losartan (COZAAR) 25 MG tablet Take 1 tablet (25 mg total) by mouth once daily. 90 tablet 3    metoprolol succinate (TOPROL-XL) 200 MG 24 hr tablet Take 1 tablet (200 mg total) by mouth once daily. 90 tablet 1    [DISCONTINUED] amLODIPine (NORVASC) 10 MG tablet Take 1 tablet (10 mg total) by mouth every evening. 90 tablet 1    [DISCONTINUED] ergocalciferol (ERGOCALCIFEROL) 50,000 unit Cap Take 1 capsule by mouth once a week 12 capsule 0    [DISCONTINUED] meloxicam (MOBIC) 15 MG tablet Take 1 tablet (15 mg total) by mouth once daily. 90 tablet 1    [DISCONTINUED] pantoprazole (PROTONIX) 40 MG tablet Take 1 tablet (40 mg total) by mouth once daily. 90 tablet 1       Allergies  Review of patient's allergies indicates:  No Known Allergies    Physical Examination     Vitals:    03/24/25 1504   BP: 135/78   Pulse: 62   Resp: 18   Temp: 98.2 °F (36.8 °C)     Physical Exam  Constitutional:       General: He is not in acute distress.     Appearance:  Normal appearance. He is normal weight.   HENT:      Head: Normocephalic.      Nose: Nose normal.      Mouth/Throat:      Mouth: Mucous membranes are moist.      Pharynx: Oropharynx is clear. No oropharyngeal exudate or posterior oropharyngeal erythema.   Eyes:      General: No scleral icterus.        Right eye: No discharge.         Left eye: No discharge.      Conjunctiva/sclera: Conjunctivae normal.      Pupils: Pupils are equal, round, and reactive to light.   Cardiovascular:      Rate and Rhythm: Normal rate and regular rhythm.      Pulses: Normal pulses.      Heart sounds: Normal heart sounds. No murmur heard.  Pulmonary:      Effort: Pulmonary effort is normal.      Breath sounds: Normal breath sounds.   Abdominal:      General: Abdomen is flat. Bowel sounds are normal.      Tenderness: There is no abdominal tenderness.   Musculoskeletal:         General: Normal range of motion.      Cervical back: Normal range of motion.      Right lower leg: No edema.      Left lower leg: No edema.   Skin:     General: Skin is warm.      Capillary Refill: Capillary refill takes less than 2 seconds.      Findings: Rash present.      Comments: Erythematous papules in both the antecubital fossa as well as the axilla   Neurological:      General: No focal deficit present.      Mental Status: He is alert and oriented to person, place, and time. Mental status is at baseline.      Cranial Nerves: No cranial nerve deficit.      Sensory: No sensory deficit.      Motor: No weakness.      Coordination: Coordination normal.      Gait: Gait normal.      Deep Tendon Reflexes: Reflexes normal.   Psychiatric:         Mood and Affect: Mood normal.         Behavior: Behavior normal.         Thought Content: Thought content normal.         Judgment: Judgment normal.               Lab Results   Component Value Date    WBC 9.79 03/20/2024    HGB 14.7 03/20/2024    HCT 42.8 03/20/2024    MCV 88.4 03/20/2024     03/20/2024          Sodium    Date Value Ref Range Status   06/20/2024 141 136 - 145 mmol/L Final     Potassium   Date Value Ref Range Status   09/23/2024 3.3 (L) 3.5 - 5.1 mmol/L Final     Chloride   Date Value Ref Range Status   06/20/2024 106 98 - 107 mmol/L Final     CO2   Date Value Ref Range Status   06/20/2024 25 21 - 32 mmol/L Final     Glucose   Date Value Ref Range Status   06/20/2024 99 74 - 106 mg/dL Final     BUN   Date Value Ref Range Status   06/20/2024 9 7 - 18 mg/dL Final     Creatinine   Date Value Ref Range Status   06/20/2024 1.08 0.70 - 1.30 mg/dL Final     Calcium   Date Value Ref Range Status   06/20/2024 9.9 8.5 - 10.1 mg/dL Final     Total Protein   Date Value Ref Range Status   03/20/2024 8.4 (H) 6.4 - 8.2 g/dL Final   03/20/2024 8.6 (H) 6.4 - 8.2 g/dL Final     Albumin   Date Value Ref Range Status   03/20/2024 4.4 3.5 - 5.0 g/dL Final   03/20/2024 4.3 3.5 - 5.0 g/dL Final     Bilirubin, Total   Date Value Ref Range Status   03/20/2024 0.3 >0.0 - 1.2 mg/dL Final   03/20/2024 0.3 >0.0 - 1.2 mg/dL Final     Alk Phos   Date Value Ref Range Status   03/20/2024 80 45 - 115 U/L Final   03/20/2024 73 45 - 115 U/L Final     AST   Date Value Ref Range Status   03/20/2024 21 15 - 37 U/L Final   03/20/2024 24 15 - 37 U/L Final     ALT   Date Value Ref Range Status   03/20/2024 36 16 - 61 U/L Final   03/20/2024 35 16 - 61 U/L Final     Anion Gap   Date Value Ref Range Status   06/20/2024 13 7 - 16 mmol/L Final     eGFR    Date Value Ref Range Status   10/28/2020 77       Comment:     The above 11 analytes were performed by [facility] [id number] [place] [address],[place] [id number]     eGFR   Date Value Ref Range Status   06/27/2022 77 >=60 mL/min/1.73m² Final      X-Ray Hip 1 View Left (with Pelvis when performed)  Narrative: EXAMINATION:  XR HIP 1 VIEW LEFT (WITH PELVIS WHEN PERFORMED)    CLINICAL HISTORY:  Pain in hip    TECHNIQUE:  Left hip and AP pelvis, two  views:    COMPARISON:  None.    FINDINGS:  Osteoarthritis is present in the left hip.  No abnormalities are seen elsewhere in the left pelvis.  There is osteoarthritis in the right hip.  SI joints are normal.  Impression: Osteoarthritis is present in both hips, right side greater than left.    Place of service: [facility]    Electronically signed by: Mireille Cancino  Date:    02/12/2024  Time:    12:24     Procedures   Lab Results   Component Value Date    CHOL 135 03/20/2024    CHOL 152 02/13/2023    CHOL 186 01/09/2023     Lab Results   Component Value Date    HDL 59 03/20/2024    HDL 54 02/13/2023    HDL 51 01/09/2023     Lab Results   Component Value Date    LDLCALC 67 03/20/2024    LDLCALC 88 02/13/2023    LDLCALC 124 01/09/2023     Lab Results   Component Value Date    TRIG 47 03/20/2024    TRIG 52 02/13/2023    TRIG 56 01/09/2023     Lab Results   Component Value Date    CHOLHDL 2.3 03/20/2024    CHOLHDL 2.8 02/13/2023    CHOLHDL 3.6 01/09/2023      Lab Results   Component Value Date    HGBA1C 5.9 06/20/2024      Lab Results   Component Value Date    TSH 1.150 06/20/2024      Assessment and Plan (including Health Maintenance)      Problem List  Smart Sets  Document Outside HM   :    Plan:         Health Maintenance Due   Topic Date Due    Pneumococcal Vaccines (Age 50+) (1 of 2 - PCV) Never done    Shingles Vaccine (1 of 2) Never done    COVID-19 Vaccine (3 - 2024-25 season) 09/01/2024       Problem List Items Addressed This Visit       Routine general medical examination at a health care facility - Primary    Current Assessment & Plan   Patient in for routine physical today.  He does have a rash that is not getting any better with the antibiotic ointment that we had given him previously.  Looks eczematous and so will treat him with triamcinolone cream falling back in 2 weeks.  His overall physical exam is normal except he does have some worsening symptoms of indigestion so will increase his  pantoprazole.  Labs today to include a CBC CMP and lipid panel.  Goal for his lipids is a LDL of 70 or less his last 1 was 67.  Refilled his medications.  Follow-up 3 months         Hypertension    Current Assessment & Plan   Hypertension well controlled no change in medication.  Follow-up in 3 months.  Will check CBC and CMP yearly.  Goal is blood pressure 120/70 ordered a lipid panel also.         Relevant Medications    amLODIPine (NORVASC) 10 MG tablet    Other Relevant Orders    CBC Auto Differential    Comprehensive Metabolic Panel    Lipid Panel    Mixed hyperlipidemia    Overview   AHA 10 year risk is 10.5 %         Current Assessment & Plan   Lab Results   Component Value Date    LDLCALC 67 03/20/2024    Good control of his lipids but would like to see his LDL at 55 or lower though 70 is okay for him since he has not had an event i.e. MI         Relevant Medications    rosuvastatin (CRESTOR) 10 MG tablet    Other Relevant Orders    Lipid Panel    PUD (peptic ulcer disease)    Current Assessment & Plan   Peptic ulcer disease with likely worsening secondary to the meloxicam.  Will increase his pantoprazole to twice daily.  Start him on Zofran 8 mg oral dissolving dose p.r.n. nausea vomiting.  Follow-up in 3 months or p.r.n.         Relevant Medications    ondansetron (ZOFRAN-ODT) 8 MG TbDL    pantoprazole (PROTONIX) 40 MG tablet    Dermatitis    Current Assessment & Plan   Patient has a dermatitis with did occurring in the axilla and the antecubital feels as well as behind the knees.  Triamcinolone cream with 60 g filled apply b.i.d. to the rash and follow-up as needed if it does not improve.  DC the ketoconazole cream.         Corporo-venous occlusive erectile dysfunction    Current Assessment & Plan   Viagra is working well for him.  No changes in his treatment at this time.         Relevant Medications    sildenafiL (VIAGRA) 100 MG tablet     Other Visit Diagnoses         Vitamin D deficiency         Relevant Medications    ergocalciferol (ERGOCALCIFEROL) 50,000 unit Cap      Primary osteoarthritis of both hips        Relevant Medications    meloxicam (MOBIC) 15 MG tablet      Eczema, unspecified type        Relevant Medications    triamcinolone acetonide 0.1% (KENALOG) 0.1 % cream            Health Maintenance Topics with due status: Not Due       Topic Last Completion Date    TETANUS VACCINE 01/27/2022    Colorectal Cancer Screening 04/11/2023    Lipid Panel 03/20/2024    Hemoglobin A1c (Prediabetes) 06/20/2024    RSV Vaccine (Age 60+ and Pregnant patients) Not Due       Future Appointments   Date Time Provider Department Center   6/25/2025  1:40 PM Zen Pacheco DO [facility]        Follow up in about 3 months (around 6/24/2025).     Signature:  Zen Pacheco DO  [facility]  [address] [place] [address], [place]  [id number]    Date of encounter: 3/24/25     1 Principal Discharge DX:	Otitis externa

## 2025-03-24 NOTE — ASSESSMENT & PLAN NOTE
Lab Results   Component Value Date    LDLCALC 67 03/20/2024    Good control of his lipids but would like to see his LDL at 55 or lower though 70 is okay for him since he has not had an event i.e. MI

## 2025-03-24 NOTE — ASSESSMENT & PLAN NOTE
Peptic ulcer disease with likely worsening secondary to the meloxicam.  Will increase his pantoprazole to twice daily.  Start him on Zofran 8 mg oral dissolving dose p.r.n. nausea vomiting.  Follow-up in 3 months or p.r.n.

## 2025-03-24 NOTE — ASSESSMENT & PLAN NOTE
Patient in for routine physical today.  He does have a rash that is not getting any better with the antibiotic ointment that we had given him previously.  Looks eczematous and so will treat him with triamcinolone cream falling back in 2 weeks.  His overall physical exam is normal except he does have some worsening symptoms of indigestion so will increase his pantoprazole.  Labs today to include a CBC CMP and lipid panel.  Goal for his lipids is a LDL of 70 or less his last 1 was 67.  Refilled his medications.  Follow-up 3 months

## 2025-04-09 NOTE — TELEPHONE ENCOUNTER
Spoke with pt. Explained below msg. Scheduled first available appt which was in June.    LVM for pt to return call. Pt last OV in July 2024. Was due to f/u in 6 months. Needs OV. Can rf until appt.

## 2025-04-13 RX ORDER — METOPROLOL SUCCINATE 200 MG/1
200 TABLET, EXTENDED RELEASE ORAL
Qty: 90 TABLET | Refills: 0 | Status: SHIPPED | OUTPATIENT
Start: 2025-04-13

## 2025-06-11 ENCOUNTER — OFFICE VISIT (OUTPATIENT)
Dept: CARDIOLOGY | Facility: CLINIC | Age: 60
End: 2025-06-11
Payer: COMMERCIAL

## 2025-06-11 VITALS
WEIGHT: 241 LBS | DIASTOLIC BLOOD PRESSURE: 90 MMHG | HEIGHT: 72 IN | HEART RATE: 78 BPM | OXYGEN SATURATION: 99 % | BODY MASS INDEX: 32.64 KG/M2 | SYSTOLIC BLOOD PRESSURE: 130 MMHG

## 2025-06-11 DIAGNOSIS — I10 ESSENTIAL HYPERTENSION: ICD-10-CM

## 2025-06-11 DIAGNOSIS — E78.2 MIXED HYPERLIPIDEMIA: ICD-10-CM

## 2025-06-11 DIAGNOSIS — R00.1 SINUS BRADYCARDIA BY ELECTROCARDIOGRAM: Primary | ICD-10-CM

## 2025-06-11 PROCEDURE — 4010F ACE/ARB THERAPY RXD/TAKEN: CPT | Mod: CPTII,,, | Performed by: INTERNAL MEDICINE

## 2025-06-11 PROCEDURE — 3075F SYST BP GE 130 - 139MM HG: CPT | Mod: CPTII,,, | Performed by: INTERNAL MEDICINE

## 2025-06-11 PROCEDURE — 99214 OFFICE O/P EST MOD 30 MIN: CPT | Mod: PBBFAC | Performed by: INTERNAL MEDICINE

## 2025-06-11 PROCEDURE — 1159F MED LIST DOCD IN RCRD: CPT | Mod: CPTII,,, | Performed by: INTERNAL MEDICINE

## 2025-06-11 PROCEDURE — 3080F DIAST BP >= 90 MM HG: CPT | Mod: CPTII,,, | Performed by: INTERNAL MEDICINE

## 2025-06-11 PROCEDURE — 93005 ELECTROCARDIOGRAM TRACING: CPT | Mod: PBBFAC | Performed by: INTERNAL MEDICINE

## 2025-06-11 PROCEDURE — 99999 PR PBB SHADOW E&M-EST. PATIENT-LVL IV: CPT | Mod: PBBFAC,,, | Performed by: INTERNAL MEDICINE

## 2025-06-11 PROCEDURE — 99213 OFFICE O/P EST LOW 20 MIN: CPT | Mod: S$PBB,,, | Performed by: INTERNAL MEDICINE

## 2025-06-11 PROCEDURE — 3008F BODY MASS INDEX DOCD: CPT | Mod: CPTII,,, | Performed by: INTERNAL MEDICINE

## 2025-06-11 PROCEDURE — 1160F RVW MEDS BY RX/DR IN RCRD: CPT | Mod: CPTII,,, | Performed by: INTERNAL MEDICINE

## 2025-06-11 PROCEDURE — 93010 ELECTROCARDIOGRAM REPORT: CPT | Mod: S$PBB,,, | Performed by: INTERNAL MEDICINE

## 2025-06-11 RX ORDER — HYDROCHLOROTHIAZIDE 12.5 MG/1
12.5 CAPSULE ORAL DAILY
Qty: 30 CAPSULE | Refills: 11 | Status: SHIPPED | OUTPATIENT
Start: 2025-06-11 | End: 2026-06-11

## 2025-06-11 NOTE — PATIENT INSTRUCTIONS
START TAKING HYDROCHLOROTHIAZIDE 12.5MG ONCE DAILY  FOLLOW-UP WITH ELSA CASTREJON IN 1 MONTH  FOLLOW-UP WITH  IN 6 MONTHS

## 2025-06-11 NOTE — PROGRESS NOTES
PCP: Zen Pacheco DO    Referring Provider:     Subjective:   Maranda Clark is a 59 y.o. male with hx of hypertension, mixed hyperlipidemia, who presents for follow up on bradycardia. Denies any chest pain, sob, lightheadedness, blurry vision, or dizziness. 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 Family history of hypertension and MI.  No prior personal history of cardiac disease including arrhythmias, myocardial ischemia/infarction.  He notes blood pressure is frequently elevated in physicians offices, however not elevated at home.       Fhx:   Family History   Problem Relation Name Age of Onset    Hypertension Mother      Heart attack Mother      No Known Problems Father      Hypertension Sister      Diabetes Sister      Hypertension Sister      Hypertension Sister      No Known Problems Brother      Hypertension Daughter      Hypertension Daughter      Hypertension Daughter      No Known Problems Maternal Grandmother      No Known Problems Maternal Grandfather      No Known Problems Paternal Grandmother      No Known Problems Paternal Grandfather        Shx:   Past Surgical History:   Procedure Laterality Date    CHOLECYSTECTOMY 2016        EKG -   Results for orders placed or performed in visit on 07/10/24   EKG 12-lead    Collection Time: 07/10/24  3:09 PM   Result Value Ref Range    QRS Duration 86 ms    OHS QTC Calculation 423 ms    Narrative    Test Reason : I10,    Vent. Rate : 059 BPM     Atrial Rate : 059 BPM     P-R Int : 152 ms          QRS Dur : 086 ms      QT Int : 428 ms       P-R-T Axes : 069 053 -11 degrees     QTc Int : 423 ms    Sinus bradycardia  Nonspecific ST abnormality  Abnormal QRS-T angle, consider primary T wave abnormality  Abnormal ECG  No previous ECGs available  Confirmed by Torey Marroquin DO ([id number]) on 7/12/2024 3:25:03 PM    Referred By: LUCIEN DELGADILLO           Confirmed By:Torey  LUKE Marroquin DO        ECHO -No results found for this or any previous visit.        CATH - No results found for this or any previous visit.       Stress - No results found for this or any previous visit.       Lab Results   Component Value Date     03/24/2025    K 3.8 03/24/2025     03/24/2025    CO2 25 03/24/2025    BUN 12 03/24/2025    CREATININE 1.10 03/24/2025    CALCIUM 9.5 03/24/2025    ANIONGAP 13 03/24/2025    ESTGFRAFRICA 77 10/28/2020    EGFRNONAA 77 06/27/2022       Lab Results   Component Value Date    CHOL 125 03/24/2025    CHOL 135 03/20/2024    CHOL 152 02/13/2023     Lab Results   Component Value Date    HDL 50 03/24/2025    HDL 59 03/20/2024    HDL 54 02/13/2023     Lab Results   Component Value Date    LDLCALC 66 03/24/2025    LDLCALC 67 03/20/2024    LDLCALC 88 02/13/2023     Lab Results   Component Value Date    TRIG 47 03/24/2025    TRIG 47 03/20/2024    TRIG 52 02/13/2023     Lab Results   Component Value Date    CHOLHDL 2.5 03/24/2025    CHOLHDL 2.3 03/20/2024    CHOLHDL 2.8 02/13/2023       Lab Results   Component Value Date    WBC 10.09 03/24/2025    HGB 13.7 03/24/2025    HCT 42.4 03/24/2025    MCV 91.8 03/24/2025     03/24/2025           Current Outpatient Medications:     ergocalciferol (ERGOCALCIFEROL) 50,000 unit Cap, Take 1 capsule (50,000 Units total) by mouth every 7 days., Disp: 12 capsule, Rfl: 3    losartan (COZAAR) 25 MG tablet, Take 1 tablet (25 mg total) by mouth once daily., Disp: 90 tablet, Rfl: 3    meloxicam (MOBIC) 15 MG tablet, Take 1 tablet (15 mg total) by mouth once daily., Disp: 90 tablet, Rfl: 1    metoprolol succinate (TOPROL-XL) 200 MG 24 hr tablet, Take 1 tablet by mouth once daily, Disp: 90 tablet, Rfl: 0    ondansetron (ZOFRAN-ODT) 8 MG TbDL, Take 1 tablet (8 mg total) by mouth every 6 (six) hours as needed (nausea)., Disp: 30 tablet, Rfl: 2    pantoprazole (PROTONIX) 40 MG tablet, Take 1 tablet (40 mg total) by mouth 2 (two) times daily., Disp:  180 tablet, Rfl: 1    rosuvastatin (CRESTOR) 10 MG tablet, Take 1 tablet (10 mg total) by mouth once daily., Disp: 90 tablet, Rfl: 1    amLODIPine (NORVASC) 10 MG tablet, Take 1 tablet (10 mg total) by mouth every evening., Disp: 90 tablet, Rfl: 1    hydroCHLOROthiazide (MICROZIDE) 12.5 mg capsule, Take 1 capsule (12.5 mg total) by mouth once daily., Disp: 30 capsule, Rfl: 11    ketoconazole (NIZORAL) 2 % cream, Apply topically once daily., Disp: 120 g, Rfl: 2    sildenafiL (VIAGRA) 100 MG tablet, Take 1 tablet (100 mg total) by mouth daily as needed for Erectile Dysfunction., Disp: 15 tablet, Rfl: 3    triamcinolone acetonide 0.1% (KENALOG) 0.1 % cream, Apply topically 2 (two) times daily., Disp: 60 g, Rfl: 5    Review of Systems   Constitutional: Negative for malaise/fatigue and weight gain.   Eyes:  Negative for blurred vision.   Cardiovascular:  Negative for chest pain, irregular heartbeat, leg swelling, near-syncope, orthopnea and paroxysmal nocturnal dyspnea.   Respiratory:  Positive for snoring.    Gastrointestinal:  Negative for abdominal pain.        Occasional nause          Objective:   BP (!) 130/90 (BP Location: Left arm, Patient Position: Sitting)   Pulse 78   Ht 6' (1.829 m)   Wt 109.3 kg (241 lb)   SpO2 99%   BMI 32.69 kg/m²       Physical Exam  Vitals reviewed.   Constitutional:       Appearance: Normal appearance.   Neck:      Vascular: No carotid bruit.   Cardiovascular:      Rate and Rhythm: Regular rhythm. Bradycardia present.      Pulses: Normal pulses.      Heart sounds: No murmur heard.     No friction rub. No gallop.   Pulmonary:      Effort: Pulmonary effort is normal.      Breath sounds: Normal breath sounds. No wheezing, rhonchi or rales.   Musculoskeletal:      Cervical back: Neck supple.   Skin:     General: Skin is warm and dry.      Capillary Refill: Capillary refill takes less than 2 seconds.   Neurological:      Mental Status: He is alert.         Assessment:     1. Sinus  bradycardia by electrocardiogram  EKG 12-lead    EKG 12-lead    Bradycardia resolved, continues on metoprolol, continue to monitor clincally, reassess if becomes symptomatic      2. Essential hypertension      Adequate control on current meds      3. Mixed hyperlipidemia      following with primary care, reports is at goal on current meds.            Plan:   Follow up in one year, refill HCTZ, sooner if symptoms change

## 2025-06-12 LAB
OHS QRS DURATION: 96 MS
OHS QTC CALCULATION: 409 MS

## 2025-06-25 ENCOUNTER — OFFICE VISIT (OUTPATIENT)
Dept: FAMILY MEDICINE | Facility: CLINIC | Age: 60
End: 2025-06-25
Payer: COMMERCIAL

## 2025-06-25 ENCOUNTER — TELEPHONE (OUTPATIENT)
Dept: FAMILY MEDICINE | Facility: CLINIC | Age: 60
End: 2025-06-25
Payer: COMMERCIAL

## 2025-06-25 VITALS
HEIGHT: 72 IN | OXYGEN SATURATION: 96 % | RESPIRATION RATE: 18 BRPM | SYSTOLIC BLOOD PRESSURE: 121 MMHG | HEART RATE: 65 BPM | DIASTOLIC BLOOD PRESSURE: 78 MMHG | TEMPERATURE: 99 F | BODY MASS INDEX: 30.34 KG/M2 | WEIGHT: 224 LBS

## 2025-06-25 DIAGNOSIS — K27.9 PUD (PEPTIC ULCER DISEASE): ICD-10-CM

## 2025-06-25 DIAGNOSIS — H65.01 NON-RECURRENT ACUTE SEROUS OTITIS MEDIA OF RIGHT EAR: ICD-10-CM

## 2025-06-25 DIAGNOSIS — E78.2 MIXED HYPERLIPIDEMIA: ICD-10-CM

## 2025-06-25 DIAGNOSIS — L30.9 DERMATITIS: ICD-10-CM

## 2025-06-25 DIAGNOSIS — N52.02 CORPORO-VENOUS OCCLUSIVE ERECTILE DYSFUNCTION: ICD-10-CM

## 2025-06-25 DIAGNOSIS — I10 PRIMARY HYPERTENSION: ICD-10-CM

## 2025-06-25 DIAGNOSIS — I10 ESSENTIAL HYPERTENSION: ICD-10-CM

## 2025-06-25 DIAGNOSIS — Z13.1 SCREENING FOR DIABETES MELLITUS: Primary | ICD-10-CM

## 2025-06-25 LAB
EST. AVERAGE GLUCOSE BLD GHB EST-MCNC: 128 MG/DL
HBA1C MFR BLD HPLC: 6.1 %
UREA BREATH TEST QL: NEGATIVE

## 2025-06-25 PROCEDURE — 3008F BODY MASS INDEX DOCD: CPT | Mod: CPTII,,, | Performed by: FAMILY MEDICINE

## 2025-06-25 PROCEDURE — 3074F SYST BP LT 130 MM HG: CPT | Mod: CPTII,,, | Performed by: FAMILY MEDICINE

## 2025-06-25 PROCEDURE — 83036 HEMOGLOBIN GLYCOSYLATED A1C: CPT | Mod: ,,, | Performed by: CLINICAL MEDICAL LABORATORY

## 2025-06-25 PROCEDURE — 99214 OFFICE O/P EST MOD 30 MIN: CPT | Mod: ,,, | Performed by: FAMILY MEDICINE

## 2025-06-25 PROCEDURE — 4010F ACE/ARB THERAPY RXD/TAKEN: CPT | Mod: CPTII,,, | Performed by: FAMILY MEDICINE

## 2025-06-25 PROCEDURE — 83013 H PYLORI (C-13) BREATH: CPT | Mod: ,,, | Performed by: CLINICAL MEDICAL LABORATORY

## 2025-06-25 PROCEDURE — 1159F MED LIST DOCD IN RCRD: CPT | Mod: CPTII,,, | Performed by: FAMILY MEDICINE

## 2025-06-25 PROCEDURE — 3078F DIAST BP <80 MM HG: CPT | Mod: CPTII,,, | Performed by: FAMILY MEDICINE

## 2025-06-25 RX ORDER — ONDANSETRON 8 MG/1
8 TABLET, ORALLY DISINTEGRATING ORAL EVERY 6 HOURS PRN
Qty: 30 TABLET | Refills: 2 | Status: SHIPPED | OUTPATIENT
Start: 2025-06-25

## 2025-06-25 RX ORDER — SILDENAFIL 100 MG/1
100 TABLET, FILM COATED ORAL DAILY PRN
Qty: 15 TABLET | Refills: 3 | Status: SHIPPED | OUTPATIENT
Start: 2025-06-25 | End: 2026-06-25

## 2025-06-25 RX ORDER — FAMOTIDINE 40 MG/1
40 TABLET, FILM COATED ORAL DAILY
Qty: 30 TABLET | Refills: 11 | Status: SHIPPED | OUTPATIENT
Start: 2025-06-25 | End: 2026-06-25

## 2025-06-25 RX ORDER — KETOCONAZOLE 20 MG/G
CREAM TOPICAL 2 TIMES DAILY
Qty: 60 G | Refills: 5 | Status: SHIPPED | OUTPATIENT
Start: 2025-06-25

## 2025-06-25 RX ORDER — IPRATROPIUM BROMIDE 21 UG/1
2 SPRAY, METERED NASAL 3 TIMES DAILY
Qty: 15 ML | Refills: 5 | Status: SHIPPED | OUTPATIENT
Start: 2025-06-25

## 2025-06-25 RX ORDER — LOSARTAN POTASSIUM 25 MG/1
25 TABLET ORAL DAILY
Qty: 90 TABLET | Refills: 3 | Status: SHIPPED | OUTPATIENT
Start: 2025-06-25 | End: 2026-06-25

## 2025-06-25 RX ORDER — METOPROLOL SUCCINATE 200 MG/1
200 TABLET, EXTENDED RELEASE ORAL DAILY
Qty: 90 TABLET | Refills: 3 | Status: SHIPPED | OUTPATIENT
Start: 2025-06-25

## 2025-06-25 NOTE — ASSESSMENT & PLAN NOTE
Serous otitis right ear with fluid behind the eardrum in non mobile.  Will treat him with Atrovent nasal spray to see if that will open the Eustachian tube.  If it is not better in a week he is to call us back in will try oral steroids and Zithromax at that time   Left without being seen (saw a nurse but never saw a physician or midlevel provider)

## 2025-06-25 NOTE — ASSESSMENT & PLAN NOTE
Last LDL was 66.  Continue taking his rosuvastatin.  Recheck him in 3 months.  Continue on his diet.

## 2025-06-25 NOTE — ASSESSMENT & PLAN NOTE
Likely peptic ulcer disease and we will do an H pylori test on him today.  Treat him with antibiotics if that does not improve or his H pylori is positive.  Added Pepcid in addition to his pantoprazole.  Referral to GI for possible EGD.  Will follow him back in 1 month

## 2025-06-25 NOTE — ASSESSMENT & PLAN NOTE
Erectile dysfunction doing well with sildenafil.  No changes in medication.  Follow-up in 6 months.

## 2025-06-25 NOTE — TELEPHONE ENCOUNTER
Called pharmacy back and spoke with Amy, someone had already called them, was told cream to go on waistband area. Verified.

## 2025-06-25 NOTE — PROGRESS NOTES
Zen Pacheco DO   [facility]  [address]  [place] [address]     PATIENT NAME: Maranda Clark  : 1965  DATE: 25  MRN: [id number]        Chief complaint   Chief Complaint   Patient presents with    Follow-up     Mr. Clark presents today for a 3 month follow up. States he is doing okay.     Ear Problem     Also complains of having right ear issues. Started about a week ago. Started off with things sounding at a lower volume than what they are, now states hears air rushing through his hear.     Health Maintenance     Pneumococcal Vaccines (Age 50+)(1 of 2 - PCV) Never done  Shingles Vaccine(1 of 2) Never done  COVID-19 Vaccine(3 - - season) due on 2024  Hemoglobin A1c (Prediabetes) due on 2025  Declines vaccines, will get labs       History of Present Illness    CHIEF COMPLAINT:  Patient presents for a follow-up visit to discuss his blood pressure and reports ongoing stomach issues with nausea and vomiting.    HPI:  Patient reports fluctuating blood pressure, with readings around 130/80. He has stomach issues, characterized by pain and nausea after eating. He has episodes of vomiting without blood, describing the vomit as mostly mucoid. He ate coleslaw and had symptoms about 1-2 hours later, followed by vomiting later that night. He denies any pain in the upper abdominal area. Patient also reports itching in his groin area, particularly when he becomes warm. He has redness and inflammation along his belt line. He complains of fullness and a fluttering sensation in his right ear.    He denies black or bloody stools, shortness of breath, vision problems, hearing issues, sore throat, and thyroid problems. He denies arthritis symptoms in his hands, knees, or back.    SOCIAL HISTORY:  Alcohol: Occasional drinker      ROS:  General: -fever, -chills, -fatigue, -weight gain, -weight loss  Eyes: -vision changes, -redness, -discharge  ENT: -ear pain, -nasal congestion,  -sore throat, +ear pressure  Cardiovascular: -chest pain, -palpitations, -lower extremity edema  Respiratory: -cough, -shortness of breath  Gastrointestinal: -abdominal pain, +nausea, +vomiting, -diarrhea, -constipation, -blood in stool  Genitourinary: -dysuria, -hematuria, -frequency  Musculoskeletal: +joint pain, -muscle pain, +pain with movement, +limb pain  Skin: -rash, -lesion, +genital itching  Neurological: -headache, -dizziness, -numbness, -tingling  Psychiatric: -anxiety, -depression, -sleep difficulty             Current Medications[1]   Past Medical History:   Diagnosis Date    GERD (gastroesophageal reflux disease)     Hypertension     Osteoarthritis, hip, bilateral     right greather than left      Family History   Problem Relation Name Age of Onset    Hypertension Mother      Heart attack Mother      No Known Problems Father      Hypertension Sister      Diabetes Sister      Hypertension Sister      Hypertension Sister      No Known Problems Brother      Hypertension Daughter      Hypertension Daughter      Hypertension Daughter      No Known Problems Maternal Grandmother      No Known Problems Maternal Grandfather      No Known Problems Paternal Grandmother      No Known Problems Paternal Grandfather        Past Surgical History:   Procedure Laterality Date    CHOLECYSTECTOMY  2016        Physical Exam  Constitutional:       Appearance: Normal appearance.   HENT:      Head: Normocephalic.      Left Ear: Tympanic membrane normal.      Ears:      Comments: Right TM is bulging with no movement.  No redness noted.  Talks without any lesions.     Nose: Nose normal. No congestion.      Mouth/Throat:      Mouth: Mucous membranes are moist.      Pharynx: Oropharynx is clear. No oropharyngeal exudate or posterior oropharyngeal erythema.   Eyes:      General: No scleral icterus.        Right eye: No discharge.         Left eye: No discharge.      Conjunctiva/sclera: Conjunctivae normal.      Pupils: Pupils are  equal, round, and reactive to light.   Cardiovascular:      Rate and Rhythm: Normal rate and regular rhythm.      Pulses: Normal pulses.      Heart sounds: Normal heart sounds.   Pulmonary:      Effort: Pulmonary effort is normal.      Breath sounds: Normal breath sounds.   Abdominal:      General: Abdomen is flat. Bowel sounds are normal.   Musculoskeletal:         General: Normal range of motion.   Skin:     General: Skin is warm.      Capillary Refill: Capillary refill takes less than 2 seconds.      Findings: Rash present.      Comments: Right and left groin areas long also along the belt line underneath his pannus there is noted some darkened area but no redness.  Also in his groin areas also dark in.  Patient states that he gets a lot of itching so consistent with contact dermatitis in may have some tenia.   Neurological:      General: No focal deficit present.      Mental Status: He is alert and oriented to person, place, and time. Mental status is at baseline.   Psychiatric:         Mood and Affect: Mood normal.         Behavior: Behavior normal.        Objective   /78 (BP Location: Left arm, Patient Position: Sitting)   Pulse 65   Temp 99.1 °F (37.3 °C) (Oral)   Resp 18   Ht 6' (1.829 m)   Wt 101.6 kg (224 lb)   SpO2 96%   BMI 30.38 kg/m²          LABS:   No results found for this or any previous visit (from the past 2 weeks).   CMP  Sodium   Date Value Ref Range Status   03/24/2025 141 136 - 145 mmol/L Final     Potassium   Date Value Ref Range Status   03/24/2025 3.8 3.5 - 5.1 mmol/L Final     Chloride   Date Value Ref Range Status   03/24/2025 107 98 - 107 mmol/L Final     CO2   Date Value Ref Range Status   03/24/2025 25 22 - 29 mmol/L Final     Glucose   Date Value Ref Range Status   03/24/2025 105 (H) 74 - 100 mg/dL Final     BUN   Date Value Ref Range Status   03/24/2025 12 8 - 26 mg/dL Final     Creatinine   Date Value Ref Range Status   03/24/2025 1.10 0.72 - 1.25 mg/dL Final  "    Calcium   Date Value Ref Range Status   03/24/2025 9.5 8.4 - 10.2 mg/dL Final     Total Protein   Date Value Ref Range Status   03/24/2025 7.3 6.4 - 8.3 g/dL Final     Albumin   Date Value Ref Range Status   03/24/2025 3.9 3.5 - 5.0 g/dL Final     Bilirubin, Total   Date Value Ref Range Status   03/24/2025 0.5 <=1.5 mg/dL Final     Alk Phos   Date Value Ref Range Status   03/24/2025 56 40 - 150 U/L Final     AST   Date Value Ref Range Status   03/24/2025 27 11 - 45 U/L Final     ALT   Date Value Ref Range Status   03/24/2025 20 <=55 U/L Final     Anion Gap   Date Value Ref Range Status   03/24/2025 13 7 - 16 mmol/L Final     eGFR   Date Value Ref Range Status   03/24/2025 77 >=60 mL/min/1.73m2 Final     Comment:     Estimated GFR calculated using the CKD-EPI creatinine (2021) equation.      Lab Results   Component Value Date    HGBA1C 5.9 06/20/2024    HGBA1C 6.2 03/20/2024    HGBA1C 5.8 01/09/2023      Lab Results   Component Value Date    CHOL 125 03/24/2025    CHOL 135 03/20/2024    CHOL 152 02/13/2023      Lab Results   Component Value Date    HDL 50 03/24/2025    HDL 59 03/20/2024    HDL 54 02/13/2023     Lab Results   Component Value Date    LDLCALC 66 03/24/2025    LDLCALC 67 03/20/2024    LDLCALC 88 02/13/2023      Lab Results   Component Value Date    TRIG 47 03/24/2025    TRIG 47 03/20/2024    TRIG 52 02/13/2023     No results found for: "TOTALCHOLEST"  Lab Results   Component Value Date    NONHDLCHOL 75 03/24/2025    NONHDLCHOL 76 03/20/2024    NONHDLCHOL 98 02/13/2023     Lab Results   Component Value Date    CHOLHDL 2.5 03/24/2025    CHOLHDL 2.3 03/20/2024    CHOLHDL 2.8 02/13/2023        No images are attached to the encounter.     Assessment    Problem List Items Addressed This Visit       Screening for diabetes mellitus - Primary    Hemoglobin A1c for screen on diabetes.  Patient previously had been in the normal range i.e. in the 5 range         Relevant Orders    Hemoglobin A1C    Essential " hypertension    Hypertension well controlled with his goal of 120/70.  Continue taking his metoprolol and losartan.  Follow-up in 3 months         Relevant Medications    losartan (COZAAR) 25 MG tablet    Mixed hyperlipidemia    Last LDL was 66.  Continue taking his rosuvastatin.  Recheck him in 3 months.  Continue on his diet.         PUD (peptic ulcer disease)    Likely peptic ulcer disease and we will do an H pylori test on him today.  Treat him with antibiotics if that does not improve or his H pylori is positive.  Added Pepcid in addition to his pantoprazole.  Referral to GI for possible EGD.  Will follow him back in 1 month         Relevant Medications    sildenafiL (VIAGRA) 100 MG tablet    ondansetron (ZOFRAN-ODT) 8 MG TbDL    famotidine (PEPCID) 40 MG tablet    Other Relevant Orders    Hemoglobin A1C    H. pylori Breath Test    Ambulatory referral/consult to Gastroenterology    Dermatitis    Going dermatitis which will use ketoconazole.  May be related to his washing powders and he is having irritation from that because it goes around his belt line and into his groin.  Recommend he use a non scented washing detergent in rinse his clothing twice.  Ketoconazole twice daily.  Follow-up in 1 month if no better         Corporo-venous occlusive erectile dysfunction    Erectile dysfunction doing well with sildenafil.  No changes in medication.  Follow-up in 6 months.         Relevant Medications    sildenafiL (VIAGRA) 100 MG tablet    Non-recurrent acute serous otitis media of right ear    Serous otitis right ear with fluid behind the eardrum in non mobile.  Will treat him with Atrovent nasal spray to see if that will open the Eustachian tube.  If it is not better in a week he is to call us back in will try oral steroids and Zithromax at that time         Relevant Medications    ipratropium (ATROVENT) 21 mcg (0.03 %) nasal spray     Other Visit Diagnoses         Primary hypertension        Relevant Medications     losartan (COZAAR) 25 MG tablet            Assessment & Plan    IMPRESSION:  - BP control acceptable, with readings around 130/80.  - Persistent GI symptoms, suspecting possible ulcer or H. pylori.  - Discontinued meloxicam due to potential contribution to GI symptoms and potential gastric side effects.  - Skin irritation in groin area, suspecting fungal infection.  - Serous otitis in right ear, causing fullness and fluttering sensation.    ## HYPERTENSION:  - Blood pressure currently 120/78, which is excellent (target is 120/70).  - Patient reports occasional fluctuations up to 130/80.  - Continuing Metoprolol and Losartan daily for BP management with prescription refills provided.  - Discussed impact of sodium intake on blood pressure, particularly from canned and processed foods, and recommended reduction.  - Patient advised to monitor BP regularly and contact office immediately if experiencing chest pain, pressure/tightness, or shortness of breath.    ## HYPERLIPIDEMIA:  - Lipid levels are excellent with LDL at 66 and total cholesterol at 125.  - Continuing Crestor (rosuvastatin) for cholesterol management.  - Advised patient to maintain current diet and medication regimen to sustain these favorable levels.    ## GASTRODUODENITIS:  - Patient experiencing nausea and vomiting after eating, possibly exacerbated by recent coleslaw consumption, alcohol intake, and meloxicam use.  - Symptoms suggest possible ulcer or stomach lining inflammation.  - Continuing Protonix (pantoprazole) 40 mg twice daily and Zofran (ondansetron) for nausea.  - Starting Pepcid (famotidine) daily for additional gastric symptom relief.  - Ordered H. pylori test (blood or breath test to be determined) and referred to gastroenterology for upper endoscopy to evaluate for ulcers and other gastric issues.  - Recommend temporarily reducing alcohol consumption until gastric issues resolve.  - Patient instructed to contact office if symptoms don't  improve with new medication regimen.    ## ACUTE SEROUS OTITIS MEDIA (RIGHT EAR):  - Diagnosed serous otitis media due to blocked Eustachian tube, causing fullness and fluid behind right ear drum with muffled sounds and dizziness.  - Prescribed nasal spray to be used 3-4 times daily in both nostrils to open Eustachian tube.  - Recommend follow up in 1 week if ear symptoms don't improve with treatment.    ## OTHER:  - Ordered A1C blood test for diabetes screening.  - For skin irritation, recommended using fragrance-free, color-free laundry detergent and double rinsing underwear.  - Started ketoconazole cream to be applied twice daily to affected groin area for itching and potential fungal infection.  - Patient to contact office if skin irritation does not improve with new treatment.          This note was generated with the assistance of ambient listening technology. Verbal consent was obtained by the patient and accompanying visitor(s) for the recording of patient appointment to facilitate this note. I attest to having reviewed and edited the generated note for accuracy, though some syntax or spelling errors may persist. Please contact the author of this note for any clarification.     Follow up in about 3 months (around 9/25/2025).        [1]   Current Outpatient Medications:     pantoprazole (PROTONIX) 40 MG tablet, Take 1 tablet (40 mg total) by mouth 2 (two) times daily., Disp: 180 tablet, Rfl: 1    rosuvastatin (CRESTOR) 10 MG tablet, Take 1 tablet (10 mg total) by mouth once daily., Disp: 90 tablet, Rfl: 1    amLODIPine (NORVASC) 10 MG tablet, Take 1 tablet (10 mg total) by mouth every evening. (Patient not taking: Reported on 6/25/2025), Disp: 90 tablet, Rfl: 1    ergocalciferol (ERGOCALCIFEROL) 50,000 unit Cap, Take 1 capsule (50,000 Units total) by mouth every 7 days. (Patient not taking: Reported on 6/25/2025), Disp: 12 capsule, Rfl: 3    famotidine (PEPCID) 40 MG tablet, Take 1 tablet (40 mg total) by  mouth once daily., Disp: 30 tablet, Rfl: 11    hydroCHLOROthiazide (MICROZIDE) 12.5 mg capsule, Take 1 capsule (12.5 mg total) by mouth once daily. (Patient not taking: Reported on 6/25/2025), Disp: 30 capsule, Rfl: 11    ipratropium (ATROVENT) 21 mcg (0.03 %) nasal spray, 2 sprays by Each Nostril route 3 (three) times daily., Disp: 15 mL, Rfl: 5    ketoconazole (NIZORAL) 2 % cream, Apply topically once daily. (Patient not taking: Reported on 6/25/2025), Disp: 120 g, Rfl: 2    ketoconazole (NIZORAL) 2 % cream, Apply topically 2 (two) times daily., Disp: 60 g, Rfl: 5    losartan (COZAAR) 25 MG tablet, Take 1 tablet (25 mg total) by mouth once daily., Disp: 90 tablet, Rfl: 3    metoprolol succinate (TOPROL-XL) 200 MG 24 hr tablet, Take 1 tablet (200 mg total) by mouth once daily., Disp: 90 tablet, Rfl: 3    ondansetron (ZOFRAN-ODT) 8 MG TbDL, Take 1 tablet (8 mg total) by mouth every 6 (six) hours as needed (nausea)., Disp: 30 tablet, Rfl: 2    sildenafiL (VIAGRA) 100 MG tablet, Take 1 tablet (100 mg total) by mouth daily as needed for Erectile Dysfunction., Disp: 15 tablet, Rfl: 3    triamcinolone acetonide 0.1% (KENALOG) 0.1 % cream, Apply topically 2 (two) times daily. (Patient not taking: Reported on 6/25/2025), Disp: 60 g, Rfl: 5

## 2025-06-25 NOTE — ASSESSMENT & PLAN NOTE
Going dermatitis which will use ketoconazole.  May be related to his washing powders and he is having irritation from that because it goes around his belt line and into his groin.  Recommend he use a non scented washing detergent in rinse his clothing twice.  Ketoconazole twice daily.  Follow-up in 1 month if no better

## 2025-06-25 NOTE — ASSESSMENT & PLAN NOTE
Hemoglobin A1c for screen on diabetes.  Patient previously had been in the normal range i.e. in the 5 range

## 2025-06-25 NOTE — TELEPHONE ENCOUNTER
Copied from CRM #[id number]. Topic: Medications - Pharmacy  >> Jun 25, 2025  2:47 PM Natasha Landry wrote:  The RX TECH Angie called from [facility] and she wanted to know where the ketoconazole (NIZORAL) 2 % cream was being applied to, so that she would know how to fill it, the call back number is [phone].

## 2025-06-25 NOTE — ASSESSMENT & PLAN NOTE
Hypertension well controlled with his goal of 120/70.  Continue taking his metoprolol and losartan.  Follow-up in 3 months

## 2025-07-02 ENCOUNTER — TELEPHONE (OUTPATIENT)
Dept: FAMILY MEDICINE | Facility: CLINIC | Age: 60
End: 2025-07-02
Payer: COMMERCIAL

## 2025-07-02 NOTE — TELEPHONE ENCOUNTER
Copied from CRM #[id number]. Topic: Appointments - Appointment Access  >> Jul 2, 2025  3:59 PM Vielka wrote:  Pt was seen last week. He is still having ear probs and wants to come in and talk about some other probs. He wants to come tomorrow, 7/03.  Please let him know if he can be a walk in or worked in.  Who Called: Maranda Johnson    Caller is requesting a same day appointment.     When is the first available appointment?  Options offered (Virtual Visit, Urgent Care):   Symptoms or reason for appointment: Ear probs        Patient's Preferred Phone Number on File: [phone]   Best Call Back Number, if different:  Additional Information:

## 2025-07-02 NOTE — TELEPHONE ENCOUNTER
Called pt back and he stated his ear is still bothering him and wants to be seen tomorrow. Explained Dr. Pacheco is already doubled book for the next week. I offered him an appointment with first available NP. Stated that was fine. Appointment made for tomorrow with CHRIS Bonner at 2:40

## 2025-07-03 ENCOUNTER — OFFICE VISIT (OUTPATIENT)
Dept: FAMILY MEDICINE | Facility: CLINIC | Age: 60
End: 2025-07-03
Payer: COMMERCIAL

## 2025-07-03 VITALS
WEIGHT: 233.63 LBS | SYSTOLIC BLOOD PRESSURE: 121 MMHG | OXYGEN SATURATION: 99 % | DIASTOLIC BLOOD PRESSURE: 81 MMHG | RESPIRATION RATE: 20 BRPM | TEMPERATURE: 98 F | HEART RATE: 61 BPM | BODY MASS INDEX: 31.64 KG/M2 | HEIGHT: 72 IN

## 2025-07-03 DIAGNOSIS — H65.01 RIGHT ACUTE SEROUS OTITIS MEDIA, RECURRENCE NOT SPECIFIED: Primary | ICD-10-CM

## 2025-07-03 DIAGNOSIS — K92.1 BLOOD IN STOOL: ICD-10-CM

## 2025-07-03 RX ORDER — AZITHROMYCIN 250 MG/1
TABLET, FILM COATED ORAL
Qty: 6 TABLET | Refills: 0 | Status: SHIPPED | OUTPATIENT
Start: 2025-07-03 | End: 2025-07-08

## 2025-07-03 RX ORDER — DEXAMETHASONE SODIUM PHOSPHATE 4 MG/ML
6 INJECTION, SOLUTION INTRA-ARTICULAR; INTRALESIONAL; INTRAMUSCULAR; INTRAVENOUS; SOFT TISSUE
Status: COMPLETED | OUTPATIENT
Start: 2025-07-03 | End: 2025-07-03

## 2025-07-03 RX ADMIN — DEXAMETHASONE SODIUM PHOSPHATE 6 MG: 4 INJECTION, SOLUTION INTRA-ARTICULAR; INTRALESIONAL; INTRAMUSCULAR; INTRAVENOUS; SOFT TISSUE at 01:07

## 2025-07-03 NOTE — PROGRESS NOTES
"             [facility]    Merlyn Bonner, AGPCNP-BC [facility]  [address]  [place] [address]  [phone]          PATIENT NAME: Maranda Clark  : 1965  DATE: 7/3/25  MRN: [id number]          Reason for Visit        Chief Complaint   Patient presents with    Ear Fullness     Pt reports right ear still feel full or stopped up. He saw Dr Pacheco last week but states it is not any better        History of Present Illness      History of Present Illness    CHIEF COMPLAINT:  Patient presents today for right ear fullness and dizziness.    ENT:  He reports right ear symptoms for two weeks, describing the sensation as feeling like a "busted speaker" with associated fullness,  and occasional dizziness. He denies ear pain just tenderness. He has no prior evaluation by an ENT specialist and denies history of loud noise exposure.    GI CONCERNS:  He reports recent constipation over the past two weeks accompanied by bright red blood in stool. The constipation was severe initially but is currently improving. The bright red blood is a new symptom. He acknowledges potential relationship between straining during bowel movements and presence of blood, likely secondary to hemorrhoids.    CURRENT MEDICATIONS:  He did bring his medication bottles today for review and medications were updated.       ROS:  General: -fever, -chills, -fatigue, -weight gain, -weight loss, +weakness  Eyes: -vision changes, -redness, -discharge  ENT: -ear pain, -nasal congestion, -sore throat, +ear pressure  Cardiovascular: -chest pain, -palpitations, -lower extremity edema  Respiratory: -cough, -shortness of breath  Gastrointestinal: -abdominal pain, -nausea, -vomiting, -diarrhea, +constipation, +blood in stool, +bright red blood per rectum, +hemorrhoids, +anal itching  Genitourinary: -dysuria, -hematuria, -frequency  Musculoskeletal: -joint pain, -muscle pain  Skin: " -rash, -lesion  Neurological: -headache, +dizziness, -numbness, -tingling  Psychiatric: -anxiety, -depression, -sleep difficulty          MEDICAL / SURGICAL / SOCIAL HISTORY     Past Medical History:   Diagnosis Date    GERD (gastroesophageal reflux disease)     Hypertension     Osteoarthritis, hip, bilateral     right greather than left       Past Surgical History:   Procedure Laterality Date    CHOLECYSTECTOMY  2016       Social History     Tobacco Use    Smoking status: Some Days     Types: Cigars     Passive exposure: Never    Smokeless tobacco: Never    Tobacco comments:     States every so often that he does smoke a black and mild but does not smoke them daily or on a regular basis.    Vaping Use    Vaping status: Never Used   Substance Use Topics    Alcohol use: Yes     Alcohol/week: 6.0 standard drinks of alcohol     Types: 6 Cans of beer per week    Drug use: Never         I personally reviewed all past medical, surgical, and social.     MEDICATIONS / ALLERGIES / HM     Current Outpatient Medications   Medication Sig Dispense Refill    famotidine (PEPCID) 40 MG tablet Take 1 tablet (40 mg total) by mouth once daily. 30 tablet 11    hydroCHLOROthiazide (MICROZIDE) 12.5 mg capsule Take 1 capsule (12.5 mg total) by mouth once daily. 30 capsule 11    ipratropium (ATROVENT) 21 mcg (0.03 %) nasal spray 2 sprays by Each Nostril route 3 (three) times daily. 15 mL 5    ketoconazole (NIZORAL) 2 % cream Apply topically 2 (two) times daily. 60 g 5    losartan (COZAAR) 25 MG tablet Take 1 tablet (25 mg total) by mouth once daily. 90 tablet 3    metoprolol succinate (TOPROL-XL) 200 MG 24 hr tablet Take 1 tablet (200 mg total) by mouth once daily. 90 tablet 3    ondansetron (ZOFRAN-ODT) 8 MG TbDL Take 1 tablet (8 mg total) by mouth every 6 (six) hours as needed (nausea). 30 tablet 2    pantoprazole (PROTONIX) 40 MG tablet Take 1 tablet (40 mg total) by mouth 2 (two) times daily. 180 tablet 1    rosuvastatin (CRESTOR) 10  MG tablet Take 1 tablet (10 mg total) by mouth once daily. 90 tablet 1    sildenafiL (VIAGRA) 100 MG tablet Take 1 tablet (100 mg total) by mouth daily as needed for Erectile Dysfunction. 15 tablet 3    azithromycin (Z-TOMMY) 250 MG tablet Take 2 tablets by mouth on day 1; Take 1 tablet by mouth on days 2-5 6 tablet 0     No current facility-administered medications for this visit.       Review of patient's allergies indicates:  No Known Allergies    Immunization History   Administered Date(s) Administered    COVID-19, MRNA, LN-S, PF (Pfizer) (Purple Cap) 08/29/2021, 09/19/2021    Tdap 01/27/2022        Health Maintenance   Topic Date Due    Pneumococcal Vaccines (Age 50+) (1 of 2 - PCV) Never done    Shingles Vaccine (1 of 2) Never done    COVID-19 Vaccine (3 - 2024-25 season) 09/01/2024    Influenza Vaccine (1) 09/01/2025    Colorectal Cancer Screening  04/11/2026    Hemoglobin A1c (Prediabetes)  06/25/2026    Lipid Panel  03/24/2030    TETANUS VACCINE  01/27/2032    RSV Vaccine (Age 60+ and Pregnant patients) (1 - 1-dose 75+ series) 11/05/2040    Hepatitis C Screening  Completed    HIV Screening  Completed        Physical Exam      Vital Signs  Temp: 98.4 °F (36.9 °C)  Temp Source: Oral  Pulse: 61  Resp: 20  SpO2: 99 %  BP: 121/81  Pain Score: 0-No pain  Height and Weight  Height: 6' (182.9 cm)  Weight: 106 kg (233 lb 9.6 oz)  BSA (Calculated - sq m): 2.32 sq meters  BMI (Calculated): 31.7  Weight in (lb) to have BMI = 25: 183.9]    Physical Exam  Constitutional:       General: He is not in acute distress.  HENT:      Head: Normocephalic and atraumatic.      Right Ear: Ear canal and external ear normal. Tenderness present. A middle ear effusion is present. There is no impacted cerumen.      Left Ear: Tympanic membrane, ear canal and external ear normal. There is no impacted cerumen.   Cardiovascular:      Rate and Rhythm: Normal rate and regular rhythm.      Pulses: Normal pulses.      Heart sounds: Normal heart  "sounds.   Pulmonary:      Effort: Pulmonary effort is normal.      Breath sounds: Normal breath sounds.   Abdominal:      Palpations: Abdomen is soft.      Tenderness: There is no abdominal tenderness.   Skin:     General: Skin is warm and dry.   Neurological:      Mental Status: He is alert and oriented to person, place, and time.   Psychiatric:         Mood and Affect: Mood normal.         Behavior: Behavior normal.      Laboratory:    Lab Results   Component Value Date     (H) 03/24/2025     03/24/2025    K 3.8 03/24/2025     03/24/2025    CO2 25 03/24/2025    BUN 12 03/24/2025    CREATININE 1.10 03/24/2025    CALCIUM 9.5 03/24/2025    PROT 7.3 03/24/2025    ALBUMIN 3.9 03/24/2025    BILITOT 0.5 03/24/2025    ALKPHOS 56 03/24/2025    AST 27 03/24/2025    ALT 20 03/24/2025    ANIONGAP 13 03/24/2025    ESTGFRAFRICA 77 10/28/2020    EGFRNONAA 77 06/27/2022       Lab Results   Component Value Date    WBC 10.09 03/24/2025    RBC 4.62 03/24/2025    HGB 13.7 03/24/2025    HCT 42.4 03/24/2025    MCV 91.8 03/24/2025    RDW 14.2 03/24/2025     03/24/2025        Lab Results   Component Value Date    CHOL 125 03/24/2025    TRIG 47 03/24/2025    HDL 50 03/24/2025    LDLCALC 66 03/24/2025       Lab Results   Component Value Date    TSH 1.150 06/20/2024       Lab Results   Component Value Date    HGBA1C 6.1 06/25/2025    ESTIMATEDAVG 128 06/25/2025        No results found for: "[id number]"    Lab Results   Component Value Date    QMIWPMON72XS 34.6 06/20/2024       Lab Results   Component Value Date    PSA 0.884 06/20/2024       Point Of Care Testing:    No results found for: "WBCUR", "NITRITE", "UROBILINOGEN", "PROTEINPOC", "PHUR", "BLOODUR", "SPECGRAV", "KETONESU", "BILIRUBINPOC", "GLUCOSEUR"    No results found for: "[id number]", "FLUAMOLEC", "FLUBMOLEC", "MOLSTREPAPOC"    Assessment/Plan     Right acute serous otitis media, recurrence not specified  -     dexAMETHasone injection 6 mg  -     " azithromycin (Z-TOMMY) 250 MG tablet; Take 2 tablets by mouth on day 1; Take 1 tablet by mouth on days 2-5  Dispense: 6 tablet; Refill: 0    Blood in stool  Comments:  declined rectal exam  will follow up with PCP if symptoms persist worsen or new sympoms develop  instructed to discuss [facility] GI provider at visit on 07/22/2025        Assessment & Plan      RIGHT EAR OTITIS MEDIA:  - Diagnosed right serous otitis media based on reported symptoms of fullness, weakness, and dizziness in the right ear.  - Examination revealed cloudy fluid behind the eardrum, indicating possible infection and Eustachian tube dysfunction with improper fluid drainage.  - Administered Decadron 6 mg IM injection in office to expedite symptom relief.  - Prescribed Z-tommy (azithromycin) to be taken as directed.  - Will consider referral to ENT specialist if symptoms do not improve with current treatment plan.    BLOOD IN STOOL:  - Patient reports bright red blood in stool and occasional itching, likely due to hemorrhoids caused by recent constipation.  - Recommend stool softeners, increased dietary fiber, and adequate water intake to prevent straining during bowel movements.  - Encouraged patient to discuss blood in stool with GI provider at upcoming appointment.  - Advised use of OTC Miralax or Colace if straining continues and instructed to avoid straining during bowel movements, as this can exacerbate hemorrhoids.    FOLLOW UP:  - Follow up with PCP symptoms persist worsen or new symptoms develop   - Keep follow up with GI as directed.          Future Appointments   Date Time Provider Department Center   7/11/2025 10:00 AM [facility]   7/22/2025  1:00 PM [facility]   9/25/2025  1:00 PM Zen Pacheco DO [facility]       Workup results were reviewed and all questions were answered. Diagnosis and treatment options were discussed and the patient  is amenable with the  overall treatment plan. Verbal and written discharge instructions were given including to return to clinic/ED with any acute worsening of symptoms or failure of symptoms to improve. The reasons for return to the clinic/ED were explained in lay terms. No further intervention is warranted at this time. The patient agrees with the plan, expresses understanding, is hemodynamically stable and in no acute distress.     All questions answered to desired level of satisfaction    This note was generated with the assistance of ambient listening technology. Verbal consent was obtained by the patient and accompanying visitor(s) for the recording of patient appointment to facilitate this note. I attest to having reviewed and edited the generated note for accuracy, though some syntax or spelling errors may persist. Please contact the author of this note for any clarification.       COURTNEY Zamora-BC  [facility]

## 2025-07-03 NOTE — Clinical Note
Saw this patient today and he reported bright red blood in stool, he declined rectal exam today. If he fails to mention this to you as he was instructed to do today when he sees you on 07/22/2025; will you make a note to ask if he has had any further bleeding; when you see him.

## 2025-07-11 ENCOUNTER — OFFICE VISIT (OUTPATIENT)
Dept: CARDIOLOGY | Facility: CLINIC | Age: 60
End: 2025-07-11
Payer: COMMERCIAL

## 2025-07-11 VITALS
BODY MASS INDEX: 31.29 KG/M2 | HEART RATE: 48 BPM | DIASTOLIC BLOOD PRESSURE: 102 MMHG | WEIGHT: 231 LBS | HEIGHT: 72 IN | SYSTOLIC BLOOD PRESSURE: 140 MMHG | OXYGEN SATURATION: 99 %

## 2025-07-11 DIAGNOSIS — E78.2 MIXED HYPERLIPIDEMIA: ICD-10-CM

## 2025-07-11 DIAGNOSIS — R00.1 SINUS BRADYCARDIA BY ELECTROCARDIOGRAM: ICD-10-CM

## 2025-07-11 DIAGNOSIS — I10 ESSENTIAL HYPERTENSION: Primary | ICD-10-CM

## 2025-07-11 PROCEDURE — 99999 PR PBB SHADOW E&M-EST. PATIENT-LVL IV: CPT | Mod: PBBFAC,,, | Performed by: NURSE PRACTITIONER

## 2025-07-11 PROCEDURE — 99214 OFFICE O/P EST MOD 30 MIN: CPT | Mod: PBBFAC | Performed by: NURSE PRACTITIONER

## 2025-07-11 NOTE — PATIENT INSTRUCTIONS
Recheck BP  Labs in 2 weeks  Follow up with me in 3 months   BP check in 2 weeks, check BP bring log

## 2025-07-11 NOTE — PROGRESS NOTES
PCP: Zen Pacheco DO    Referring Provider:     Subjective:   Maranda Clark is a 59 y.o. male with hx of bradycardia, HTN, HLD who presents for follow up.     Pt was seen by Dr. Marroquin in cardiology clinic on 6/11/25. HCTZ 12.5mg daily was added to his medication regimen. Pt states he has just recently started taking this medication in the last 4-5 days. BP today is 140/102. He states his BP has been controlled at home with systolic readings in the 120s. He states he is nervous when he comes to the office.       Family History   Problem Relation Name Age of Onset    Hypertension Mother      Heart attack Mother      No Known Problems Father      Hypertension Sister      Diabetes Sister      Hypertension Sister      Hypertension Sister      No Known Problems Brother      Hypertension Daughter      Hypertension Daughter      Hypertension Daughter      No Known Problems Maternal Grandmother      No Known Problems Maternal Grandfather      No Known Problems Paternal Grandmother      No Known Problems Paternal Grandfather        Social History[1]     EKG   Results for orders placed or performed in visit on 06/11/25   EKG 12-lead    Collection Time: 06/11/25 10:43 AM   Result Value Ref Range    QRS Duration 96 ms    [facility] QTC Calculation 409 ms    Narrative    Test Reason : R00.1,    Vent. Rate :  63 BPM     Atrial Rate :  63 BPM     P-R Int : 178 ms          QRS Dur :  96 ms      QT Int : 400 ms       P-R-T Axes :  74  70  13 degrees    QTcB Int : 409 ms    Normal sinus rhythm  Nonspecific ST abnormality  Abnormal ECG  When compared with ECG of 10-Jul-2024 15:09,  No significant change was found  Confirmed by Torey Marroquin ([id number]) on 6/12/2025 10:20:17 PM    Referred By:            Confirmed By: Torey Marroquin     ECHO No results found for this or any previous visit.    [facility] No results found for this or any previous visit.        Lab Results   Component Value Date     03/24/2025    K 3.8 03/24/2025      03/24/2025    CO2 25 03/24/2025    BUN 12 03/24/2025    CREATININE 1.10 03/24/2025    CALCIUM 9.5 03/24/2025    ANIONGAP 13 03/24/2025    ESTGFRAFRICA 77 10/28/2020    EGFRNONAA 77 06/27/2022       Lab Results   Component Value Date    CHOL 125 03/24/2025    CHOL 135 03/20/2024    CHOL 152 02/13/2023     Lab Results   Component Value Date    HDL 50 03/24/2025    HDL 59 03/20/2024    HDL 54 02/13/2023     Lab Results   Component Value Date    LDLCALC 66 03/24/2025    LDLCALC 67 03/20/2024    LDLCALC 88 02/13/2023     Lab Results   Component Value Date    TRIG 47 03/24/2025    TRIG 47 03/20/2024    TRIG 52 02/13/2023     Lab Results   Component Value Date    CHOLHDL 2.5 03/24/2025    CHOLHDL 2.3 03/20/2024    CHOLHDL 2.8 02/13/2023       Lab Results   Component Value Date    WBC 10.09 03/24/2025    HGB 13.7 03/24/2025    HCT 42.4 03/24/2025    MCV 91.8 03/24/2025     03/24/2025         Current Medications[2]    Review of Systems   Constitutional:  Negative for chills, diaphoresis, fever and malaise/fatigue.   Respiratory:  Negative for cough and shortness of breath.    Cardiovascular:  Negative for chest pain, palpitations, orthopnea, leg swelling and PND.   Gastrointestinal:  Negative for abdominal pain, nausea and vomiting.   Musculoskeletal:  Negative for falls.   Neurological:  Negative for focal weakness and weakness.         Objective:   BP (!) 140/102 (BP Location: Left arm, Patient Position: Sitting)   Pulse (!) 48   Ht 6' (1.829 m)   Wt 104.8 kg (231 lb)   SpO2 99%   BMI 31.33 kg/m²     Physical Exam  Constitutional:       General: He is not in acute distress.     Appearance: Normal appearance.   Cardiovascular:      Rate and Rhythm: Normal rate and regular rhythm.      Pulses: Normal pulses.      Heart sounds: Normal heart sounds. No murmur heard.  Pulmonary:      Effort: Pulmonary effort is normal.      Breath sounds: Normal breath sounds.   Musculoskeletal:      Cervical back: Neck supple. No  rigidity.      Right lower leg: No edema.      Left lower leg: No edema.   Skin:     General: Skin is warm and dry.   Neurological:      Mental Status: He is alert.           Assessment:     1. Essential hypertension  Basic Metabolic Panel      2. Mixed hyperlipidemia        3. Sinus bradycardia by electrocardiogram              Plan:   Essential hypertension  - BP on intake 140/102  - Recheck BP after pt sits for 5-10 minutes  - Continue HCTZ 12.5mg daily (just started this med 4-5 days ago)  - Continue amlodipine 10mg daily, losartan 25mg daily, Toprol XL 200mg daily  - Follow up in 2 weeks for labs       Mixed hyperlipidemia  LDL at goal  On Crestor    Sinus bradycardia by electrocardiogram  EKG today shows NSR      Follow up with me in 3 months       [1]   Social History  Socioeconomic History    Marital status:     Number of children: 3   Tobacco Use    Smoking status: Some Days     Types: Cigars     Passive exposure: Never    Smokeless tobacco: Never    Tobacco comments:     States every so often that he does smoke a black and mild but does not smoke them daily or on a regular basis.    Vaping Use    Vaping status: Never Used   Substance and Sexual Activity    Alcohol use: Yes     Alcohol/week: 6.0 standard drinks of alcohol     Types: 6 Cans of beer per week    Drug use: Never    Sexual activity: Yes     Partners: Female   Social History Narrative    Lives alone   [2]   Current Outpatient Medications:     famotidine (PEPCID) 40 MG tablet, Take 1 tablet (40 mg total) by mouth once daily., Disp: 30 tablet, Rfl: 11    hydroCHLOROthiazide (MICROZIDE) 12.5 mg capsule, Take 1 capsule (12.5 mg total) by mouth once daily., Disp: 30 capsule, Rfl: 11    ipratropium (ATROVENT) 21 mcg (0.03 %) nasal spray, 2 sprays by Each Nostril route 3 (three) times daily., Disp: 15 mL, Rfl: 5    ketoconazole (NIZORAL) 2 % cream, Apply topically 2 (two) times daily., Disp: 60 g, Rfl: 5    losartan (COZAAR) 25 MG tablet, Take 1  tablet (25 mg total) by mouth once daily., Disp: 90 tablet, Rfl: 3    metoprolol succinate (TOPROL-XL) 200 MG 24 hr tablet, Take 1 tablet (200 mg total) by mouth once daily., Disp: 90 tablet, Rfl: 3    ondansetron (ZOFRAN-ODT) 8 MG TbDL, Take 1 tablet (8 mg total) by mouth every 6 (six) hours as needed (nausea)., Disp: 30 tablet, Rfl: 2    pantoprazole (PROTONIX) 40 MG tablet, Take 1 tablet (40 mg total) by mouth 2 (two) times daily., Disp: 180 tablet, Rfl: 1    rosuvastatin (CRESTOR) 10 MG tablet, Take 1 tablet (10 mg total) by mouth once daily., Disp: 90 tablet, Rfl: 1    sildenafiL (VIAGRA) 100 MG tablet, Take 1 tablet (100 mg total) by mouth daily as needed for Erectile Dysfunction., Disp: 15 tablet, Rfl: 3

## 2025-07-11 NOTE — ASSESSMENT & PLAN NOTE
- BP on intake 140/102  - Recheck BP after pt sits for 5-10 minutes  - Continue HCTZ 12.5mg daily (just started this med 4-5 days ago)  - Continue amlodipine 10mg daily, losartan 25mg daily, Toprol XL 200mg daily  - Follow up in 2 weeks for labs

## 2025-08-22 ENCOUNTER — OFFICE VISIT (OUTPATIENT)
Dept: GASTROENTEROLOGY | Facility: CLINIC | Age: 60
End: 2025-08-22
Payer: COMMERCIAL

## 2025-08-22 VITALS
DIASTOLIC BLOOD PRESSURE: 93 MMHG | BODY MASS INDEX: 29.03 KG/M2 | SYSTOLIC BLOOD PRESSURE: 146 MMHG | OXYGEN SATURATION: 100 % | HEIGHT: 73 IN | HEART RATE: 65 BPM | WEIGHT: 219 LBS

## 2025-08-22 DIAGNOSIS — Z86.0101 HISTORY OF ADENOMATOUS AND SERRATED COLON POLYPS: ICD-10-CM

## 2025-08-22 DIAGNOSIS — Z80.0 FAMILY HISTORY OF PANCREATIC CANCER: ICD-10-CM

## 2025-08-22 DIAGNOSIS — K21.9 GASTROESOPHAGEAL REFLUX DISEASE, UNSPECIFIED WHETHER ESOPHAGITIS PRESENT: Primary | ICD-10-CM

## 2025-08-22 DIAGNOSIS — R63.4 UNINTENTIONAL WEIGHT LOSS: ICD-10-CM

## 2025-08-22 DIAGNOSIS — K27.9 PUD (PEPTIC ULCER DISEASE): ICD-10-CM

## 2025-08-22 PROCEDURE — 3080F DIAST BP >= 90 MM HG: CPT | Mod: CPTII,,,

## 2025-08-22 PROCEDURE — 3077F SYST BP >= 140 MM HG: CPT | Mod: CPTII,,,

## 2025-08-22 PROCEDURE — 3008F BODY MASS INDEX DOCD: CPT | Mod: CPTII,,,

## 2025-08-22 PROCEDURE — 99215 OFFICE O/P EST HI 40 MIN: CPT | Mod: PBBFAC

## 2025-08-22 PROCEDURE — 1160F RVW MEDS BY RX/DR IN RCRD: CPT | Mod: CPTII,,,

## 2025-08-22 PROCEDURE — 1159F MED LIST DOCD IN RCRD: CPT | Mod: CPTII,,,

## 2025-08-22 PROCEDURE — 99214 OFFICE O/P EST MOD 30 MIN: CPT | Mod: S$PBB,,,

## 2025-08-22 PROCEDURE — 4010F ACE/ARB THERAPY RXD/TAKEN: CPT | Mod: CPTII,,,

## 2025-08-22 PROCEDURE — 99999 PR PBB SHADOW E&M-EST. PATIENT-LVL V: CPT | Mod: PBBFAC,,,

## 2025-08-22 PROCEDURE — 3044F HG A1C LEVEL LT 7.0%: CPT | Mod: CPTII,,,

## 2025-08-27 ENCOUNTER — TELEPHONE (OUTPATIENT)
Dept: GASTROENTEROLOGY | Facility: HOSPITAL | Age: 60
End: 2025-08-27
Payer: COMMERCIAL

## 2025-09-04 ENCOUNTER — HOSPITAL ENCOUNTER (OUTPATIENT)
Dept: RADIOLOGY | Facility: HOSPITAL | Age: 60
Discharge: HOME | End: 2025-09-04
Payer: COMMERCIAL

## 2025-09-04 DIAGNOSIS — Z80.0 FAMILY HISTORY OF PANCREATIC CANCER: ICD-10-CM

## 2025-09-04 DIAGNOSIS — R63.4 UNINTENTIONAL WEIGHT LOSS: ICD-10-CM

## 2025-09-04 PROCEDURE — 76700 US EXAM ABDOM COMPLETE: CPT | Mod: 26,,, | Performed by: RADIOLOGY

## 2025-09-04 PROCEDURE — 76700 US EXAM ABDOM COMPLETE: CPT | Mod: TC

## 2025-09-05 ENCOUNTER — RESULTS FOLLOW-UP (OUTPATIENT)
Dept: GASTROENTEROLOGY | Facility: CLINIC | Age: 60
End: 2025-09-05
Payer: COMMERCIAL

## 2025-09-05 ENCOUNTER — TELEPHONE (OUTPATIENT)
Dept: GASTROENTEROLOGY | Facility: CLINIC | Age: 60
End: 2025-09-05
Payer: COMMERCIAL

## 2025-09-05 DIAGNOSIS — R93.5 ABNORMAL CT OF THE ABDOMEN: ICD-10-CM

## 2025-09-05 DIAGNOSIS — N28.89 RIGHT KIDNEY MASS: Primary | ICD-10-CM

## 2025-09-05 DIAGNOSIS — N28.89 MASS OF RIGHT KIDNEY: ICD-10-CM

## 2025-09-05 DIAGNOSIS — R93.5 ABNORMAL US (ULTRASOUND) OF ABDOMEN: Primary | ICD-10-CM
